# Patient Record
Sex: MALE | Employment: OTHER | URBAN - NONMETROPOLITAN AREA
[De-identification: names, ages, dates, MRNs, and addresses within clinical notes are randomized per-mention and may not be internally consistent; named-entity substitution may affect disease eponyms.]

---

## 2018-02-05 ENCOUNTER — HOSPITAL ENCOUNTER (OUTPATIENT)
Dept: PREADMISSION TESTING | Age: 73
Discharge: HOME | End: 2018-02-05
Payer: MEDICARE

## 2018-08-13 ENCOUNTER — HOSPITAL ENCOUNTER (OUTPATIENT)
Dept: GENERAL RADIOLOGY | Age: 73
Discharge: HOME | End: 2018-08-13
Payer: MEDICARE

## 2018-08-13 ENCOUNTER — HOSPITAL ENCOUNTER (OUTPATIENT)
Dept: PREADMISSION TESTING | Age: 73
Discharge: HOME | End: 2018-08-17
Payer: MEDICARE

## 2018-08-13 VITALS
RESPIRATION RATE: 18 BRPM | HEART RATE: 74 BPM | HEIGHT: 72 IN | TEMPERATURE: 95.8 F | BODY MASS INDEX: 29.26 KG/M2 | WEIGHT: 216.05 LBS | OXYGEN SATURATION: 98 %

## 2018-08-13 DIAGNOSIS — Z01.811 PRE-OP CHEST EXAM: ICD-10-CM

## 2018-08-13 DIAGNOSIS — I10 HYPERTENSION, UNSPECIFIED TYPE: ICD-10-CM

## 2018-08-13 LAB
ALBUMIN SERPL-MCNC: 4.2 G/DL (ref 3.5–5.1)
ANION GAP SERPL CALCULATED.3IONS-SCNC: 13 MEQ/L (ref 8–16)
BASOPHILS # BLD: 0.9 %
BASOPHILS ABSOLUTE: 0 THOU/MM3 (ref 0–0.1)
BUN BLDV-MCNC: 21 MG/DL (ref 7–22)
CALCIUM SERPL-MCNC: 10 MG/DL (ref 8.5–10.5)
CHLORIDE BLD-SCNC: 98 MEQ/L (ref 98–111)
CO2: 27 MEQ/L (ref 23–33)
CREAT SERPL-MCNC: 1.5 MG/DL (ref 0.4–1.2)
EKG ATRIAL RATE: 73 BPM
EKG P AXIS: 40 DEGREES
EKG P-R INTERVAL: 180 MS
EKG Q-T INTERVAL: 382 MS
EKG QRS DURATION: 100 MS
EKG QTC CALCULATION (BAZETT): 420 MS
EKG R AXIS: -49 DEGREES
EKG T AXIS: 72 DEGREES
EKG VENTRICULAR RATE: 73 BPM
EOSINOPHIL # BLD: 5 %
EOSINOPHILS ABSOLUTE: 0.3 THOU/MM3 (ref 0–0.4)
ERYTHROCYTE [DISTWIDTH] IN BLOOD BY AUTOMATED COUNT: 14.2 % (ref 11.5–14.5)
ERYTHROCYTE [DISTWIDTH] IN BLOOD BY AUTOMATED COUNT: 45.5 FL (ref 35–45)
GFR SERPL CREATININE-BSD FRML MDRD: 46 ML/MIN/1.73M2
GLUCOSE BLD-MCNC: 94 MG/DL (ref 70–108)
HCT VFR BLD CALC: 46.7 % (ref 42–52)
HEMOGLOBIN: 15.9 GM/DL (ref 14–18)
IMMATURE GRANS (ABS): 0.03 THOU/MM3 (ref 0–0.07)
IMMATURE GRANULOCYTES: 0.6 %
LYMPHOCYTES # BLD: 22.8 %
LYMPHOCYTES ABSOLUTE: 1.2 THOU/MM3 (ref 1–4.8)
MCH RBC QN AUTO: 30.3 PG (ref 26–33)
MCHC RBC AUTO-ENTMCNC: 34 GM/DL (ref 32.2–35.5)
MCV RBC AUTO: 89 FL (ref 80–94)
MONOCYTES # BLD: 10.7 %
MONOCYTES ABSOLUTE: 0.6 THOU/MM3 (ref 0.4–1.3)
MRSA NASAL SCREEN RT-PCR: NEGATIVE
NUCLEATED RED BLOOD CELLS: 0 /100 WBC
PLATELET # BLD: 396 THOU/MM3 (ref 130–400)
PMV BLD AUTO: 8.8 FL (ref 9.4–12.4)
POTASSIUM SERPL-SCNC: 4.4 MEQ/L (ref 3.5–5.2)
PREALBUMIN: 33 MG/DL (ref 20–40)
RBC # BLD: 5.25 MILL/MM3 (ref 4.7–6.1)
SEG NEUTROPHILS: 60 %
SEGMENTED NEUTROPHILS ABSOLUTE COUNT: 3.2 THOU/MM3 (ref 1.8–7.7)
SODIUM BLD-SCNC: 138 MEQ/L (ref 135–145)
STAPH AUREUS SCREEN RT-PCR: NEGATIVE
WBC # BLD: 5.4 THOU/MM3 (ref 4.8–10.8)

## 2018-08-13 PROCEDURE — 85025 COMPLETE CBC W/AUTO DIFF WBC: CPT

## 2018-08-13 PROCEDURE — 36415 COLL VENOUS BLD VENIPUNCTURE: CPT

## 2018-08-13 PROCEDURE — 87641 MR-STAPH DNA AMP PROBE: CPT

## 2018-08-13 PROCEDURE — 93010 ELECTROCARDIOGRAM REPORT: CPT | Performed by: NUCLEAR MEDICINE

## 2018-08-13 PROCEDURE — 93005 ELECTROCARDIOGRAM TRACING: CPT | Performed by: ORTHOPAEDIC SURGERY

## 2018-08-13 PROCEDURE — 71046 X-RAY EXAM CHEST 2 VIEWS: CPT

## 2018-08-13 PROCEDURE — 84134 ASSAY OF PREALBUMIN: CPT

## 2018-08-13 PROCEDURE — 80048 BASIC METABOLIC PNL TOTAL CA: CPT

## 2018-08-13 PROCEDURE — 87081 CULTURE SCREEN ONLY: CPT

## 2018-08-13 PROCEDURE — 87640 STAPH A DNA AMP PROBE: CPT

## 2018-08-13 PROCEDURE — 82040 ASSAY OF SERUM ALBUMIN: CPT

## 2018-08-13 RX ORDER — COVID-19 ANTIGEN TEST
KIT MISCELLANEOUS PRN
Status: ON HOLD | COMMUNITY
End: 2018-08-30 | Stop reason: HOSPADM

## 2018-08-13 RX ORDER — OLMESARTAN MEDOXOMIL AND HYDROCHLOROTHIAZIDE 40/25 40; 25 MG/1; MG/1
0.5 TABLET ORAL DAILY
COMMUNITY

## 2018-08-13 RX ORDER — METOPROLOL SUCCINATE 25 MG/1
12.5 TABLET, EXTENDED RELEASE ORAL DAILY
COMMUNITY

## 2018-08-13 ASSESSMENT — PAIN SCALES - GENERAL: PAINLEVEL_OUTOF10: 3

## 2018-08-13 ASSESSMENT — PAIN DESCRIPTION - ONSET: ONSET: AWAKENED FROM SLEEP

## 2018-08-13 ASSESSMENT — PAIN DESCRIPTION - LOCATION: LOCATION: LEG;BACK

## 2018-08-13 ASSESSMENT — PAIN DESCRIPTION - FREQUENCY: FREQUENCY: CONTINUOUS

## 2018-08-13 ASSESSMENT — PAIN DESCRIPTION - DESCRIPTORS: DESCRIPTORS: ACHING;NAGGING

## 2018-08-13 ASSESSMENT — PAIN DESCRIPTION - PROGRESSION: CLINICAL_PROGRESSION: GRADUALLY WORSENING

## 2018-08-13 ASSESSMENT — PAIN DESCRIPTION - ORIENTATION: ORIENTATION: RIGHT;LEFT

## 2018-08-13 ASSESSMENT — PAIN DESCRIPTION - PAIN TYPE: TYPE: CHRONIC PAIN

## 2018-08-13 NOTE — PROGRESS NOTES
In preparation for their surgical procedure above patient was screened for Obstructive Sleep Apnea (SARA) using the STOP-Bang Questionnaire by the Pre-Admission Testing department. This is a pre-surgical screening tool for patient safety and serves as a recommendation, this WILL NOT cause cancellation of surgery. STOP-Bang Questionnaire  * Do you currently see a pulmonologist?  No     If yes STOP, do not complete. P    1. Do you snore loudly (able to be heard in the next room)? Yes    2. Do you often feel tired or sleepy during the daytime? No       3. Has anyone ever told you that you stop breathing during your sleep? No    4. Do you have or are you being treated for high blood pressure? Yes      5. BMI more than 35? BMI (Calculated): 29.6        No    6. Age over 48 years? 74 y.o. Yes    7. Neck Circumference greater than 17 inches for male or 16 inches for female? Measured   18        (visits only)            Yes    8. Gender Male? Yes      TOTAL SCORE: 5    SARA - Low Risk : Yes to 0 - 2 questions  SARA - Intermediate Risk : Yes to 3 - 4 questions  SARA - High Risk : Yes to 5 - 8 questions    Adapted from:   STOP Questionnaire: A Tool to Screen Patients for Obstructive Sleep Apnea   DANIELITO Clinton.P.C., Mag Stewart, M.B.B.S., Trinh Ken M.D., Shawanda Dalton. Oliver Oglesby, Ph.D., Ramy Sanabria M.B.B.S., Ck Davidson M.Sc., Shukri Hillman M.D., Chaz Torres. DANIELITO Dacosta.P.C[facility]    Anesthesiology 2008; 095:801-82 Copyright 2008, the [address] of Anesthesiologists, [facility].   ----------------------------------------------------------------------------------------------------------------
Preliminary Discharge Planning Questionnaire  Date of Surgery darius   Surgeon 8/27/18      · Having the proper help and care after surgery is very important to your recovery. Who will be able to help you at home when you are discharged from the hospital? ([facility] - 24/7 for a minimum of 2 weeks)    spouse    Name(s) debaroh    · How many steps to enter your home? 2    · Bathroom on first floor? Yes    · Bedroom on the first floor? Yes    · Do you have an elevated toilet seat to use at home? No    · Do you have a walker to use at home? · Total Joints - with out wheels N/A   · Spine - with wheels  No     Have you been doing home exercises? no    *You will go home with some outpatient physical therapy, where do you prefer to go?  [facility]    *If needed, what home health agency would you like to use?  unsure    *Cardiac Rehab plans na
wear gloves and gowns when taking care of you. We do this (along with good hand hygiene) to make sure we do not spread MRSA to any another patients in our care. SURGERY PREPARATION CHECKLIST     NAME: ________________________________________     DATE OF SURGERY: ____________________________    Enter Dates, Check (?) circles to indicate task is completed. Date MUPIROCIN NASAL OINTMENT BODY CLEANSING     DAY 1 ______________________   Morning    Evening          Day 2 ______________________   Morning     Evening        Day 3 ______________________   Morning  Evening        Day 4 ______________________   Morning    Evening        Day 5 ______________________   Morning  Evening        Day 6 ______________________  (Day of Surgery)               PLEASE COMPLETE and BRING THIS CHECKLIST WITH YOU TO [address] to give to your nurse on the day of surgery. You will be notified if you need to use Mupirocin Nasal Ointment.    Lumbar Spine Surgery Pre-op Instructions   Nothing to eat or drink after midnight   Bring your medications in the original bottles    Bring photo ID and any health insurance cards   Wear comfortable clean loose fitting clothing   Do not wear any jewelry    Bring your walker   Bring your back brace if you were given one   Bring your CPAP machine if you have one   Wear clean clothes to bed and place fresh clean sheets and pillowcases on your bed the night before surgery   Spinal surgery video viewed   Routine preop instructions given for pain, hand hygiene, fall prevention, infection prevention, anesthesia, cough and deep breath, and progressive diet and ambulation   JULITO [id number]   Bathing:  o __x__Shower 2 days before, day before and morning of surgery using the StartClean® kit provided (follow the instructions provided with the kit), OR   o ____Cleanse your surgical site, 2 days before, day before and morning of surgery using the Marco 2% CHG cloths® provided (follow the instructions

## 2018-08-15 LAB — MRSA SCREEN: NORMAL

## 2018-08-20 NOTE — PROGRESS NOTES
LEFT MSG FOR TANIA, WITH  THE [facility] OF ABNORMAL EKG. CARDIAC CLEARANCE NEEDED?

## 2018-08-27 ENCOUNTER — APPOINTMENT (OUTPATIENT)
Dept: GENERAL RADIOLOGY | Age: 73
DRG: 460 | End: 2018-08-27
Attending: ORTHOPAEDIC SURGERY
Payer: MEDICARE

## 2018-08-27 ENCOUNTER — ANESTHESIA EVENT (OUTPATIENT)
Dept: OPERATING ROOM | Age: 73
DRG: 460 | End: 2018-08-27
Payer: MEDICARE

## 2018-08-27 ENCOUNTER — HOSPITAL ENCOUNTER (INPATIENT)
Age: 73
LOS: 4 days | Discharge: HOME | DRG: 460 | End: 2018-08-31
Attending: ORTHOPAEDIC SURGERY | Admitting: ORTHOPAEDIC SURGERY
Payer: MEDICARE

## 2018-08-27 ENCOUNTER — ANESTHESIA (OUTPATIENT)
Dept: OPERATING ROOM | Age: 73
DRG: 460 | End: 2018-08-27
Payer: MEDICARE

## 2018-08-27 VITALS
DIASTOLIC BLOOD PRESSURE: 59 MMHG | SYSTOLIC BLOOD PRESSURE: 112 MMHG | TEMPERATURE: 98.2 F | RESPIRATION RATE: 14 BRPM | OXYGEN SATURATION: 99 %

## 2018-08-27 DIAGNOSIS — Z98.1 S/P LUMBAR SPINAL FUSION: Primary | ICD-10-CM

## 2018-08-27 DIAGNOSIS — M48.062 SPINAL STENOSIS OF LUMBAR REGION WITH NEUROGENIC CLAUDICATION: ICD-10-CM

## 2018-08-27 LAB
ABO: NORMAL
ANTIBODY SCREEN: NORMAL
RH FACTOR: NORMAL

## 2018-08-27 PROCEDURE — 6360000002 HC RX W HCPCS: Performed by: ORTHOPAEDIC SURGERY

## 2018-08-27 PROCEDURE — 3600000015 HC SURGERY LEVEL 5 ADDTL 15MIN: Performed by: ORTHOPAEDIC SURGERY

## 2018-08-27 PROCEDURE — 0SG00AJ FUSION OF LUMBAR VERTEBRAL JOINT WITH INTERBODY FUSION DEVICE, POSTERIOR APPROACH, ANTERIOR COLUMN, OPEN APPROACH: ICD-10-PCS | Performed by: ORTHOPAEDIC SURGERY

## 2018-08-27 PROCEDURE — P9045 ALBUMIN (HUMAN), 5%, 250 ML: HCPCS

## 2018-08-27 PROCEDURE — 6370000000 HC RX 637 (ALT 250 FOR IP)

## 2018-08-27 PROCEDURE — 6360000002 HC RX W HCPCS: Performed by: PHYSICIAN ASSISTANT

## 2018-08-27 PROCEDURE — 72020 X-RAY EXAM OF SPINE 1 VIEW: CPT

## 2018-08-27 PROCEDURE — 95940 IONM IN OPERATNG ROOM 15 MIN: CPT | Performed by: ORTHOPAEDIC SURGERY

## 2018-08-27 PROCEDURE — 2580000003 HC RX 258: Performed by: ORTHOPAEDIC SURGERY

## 2018-08-27 PROCEDURE — 7100000000 HC PACU RECOVERY - FIRST 15 MIN: Performed by: ORTHOPAEDIC SURGERY

## 2018-08-27 PROCEDURE — 6370000000 HC RX 637 (ALT 250 FOR IP): Performed by: ORTHOPAEDIC SURGERY

## 2018-08-27 PROCEDURE — 86923 COMPATIBILITY TEST ELECTRIC: CPT

## 2018-08-27 PROCEDURE — 2580000003 HC RX 258: Performed by: ANESTHESIOLOGY

## 2018-08-27 PROCEDURE — 2720000010 HC SURG SUPPLY STERILE: Performed by: ORTHOPAEDIC SURGERY

## 2018-08-27 PROCEDURE — 72100 X-RAY EXAM L-S SPINE 2/3 VWS: CPT

## 2018-08-27 PROCEDURE — 6360000002 HC RX W HCPCS

## 2018-08-27 PROCEDURE — 86850 RBC ANTIBODY SCREEN: CPT

## 2018-08-27 PROCEDURE — 7100000001 HC PACU RECOVERY - ADDTL 15 MIN: Performed by: ORTHOPAEDIC SURGERY

## 2018-08-27 PROCEDURE — C1713 ANCHOR/SCREW BN/BN,TIS/BN: HCPCS | Performed by: ORTHOPAEDIC SURGERY

## 2018-08-27 PROCEDURE — 36415 COLL VENOUS BLD VENIPUNCTURE: CPT

## 2018-08-27 PROCEDURE — 3600000005 HC SURGERY LEVEL 5 BASE: Performed by: ORTHOPAEDIC SURGERY

## 2018-08-27 PROCEDURE — 3700000001 HC ADD 15 MINUTES (ANESTHESIA): Performed by: ORTHOPAEDIC SURGERY

## 2018-08-27 PROCEDURE — 2500000003 HC RX 250 WO HCPCS: Performed by: ANESTHESIOLOGY

## 2018-08-27 PROCEDURE — 1200000000 HC SEMI PRIVATE

## 2018-08-27 PROCEDURE — 86900 BLOOD TYPING SEROLOGIC ABO: CPT

## 2018-08-27 PROCEDURE — 2500000003 HC RX 250 WO HCPCS: Performed by: ORTHOPAEDIC SURGERY

## 2018-08-27 PROCEDURE — 2709999900 HC NON-CHARGEABLE SUPPLY: Performed by: ORTHOPAEDIC SURGERY

## 2018-08-27 PROCEDURE — 6360000002 HC RX W HCPCS: Performed by: ANESTHESIOLOGY

## 2018-08-27 PROCEDURE — 2580000003 HC RX 258: Performed by: PHYSICIAN ASSISTANT

## 2018-08-27 PROCEDURE — 86901 BLOOD TYPING SEROLOGIC RH(D): CPT

## 2018-08-27 PROCEDURE — L8699 PROSTHETIC IMPLANT NOS: HCPCS | Performed by: ORTHOPAEDIC SURGERY

## 2018-08-27 PROCEDURE — 3700000000 HC ANESTHESIA ATTENDED CARE: Performed by: ORTHOPAEDIC SURGERY

## 2018-08-27 DEVICE — CURVED ROD, 5.5 X 80
Type: IMPLANTABLE DEVICE | Site: SPINE LUMBAR | Status: FUNCTIONAL
Brand: CURVED ROD, 5.5 X 80

## 2018-08-27 DEVICE — CROSS CONNECTOR, MEDIUM-LARGE
Type: IMPLANTABLE DEVICE | Site: SPINE LUMBAR | Status: FUNCTIONAL
Brand: CROSS CONNECTOR, MEDIUM-LARGE

## 2018-08-27 DEVICE — IMPLANTABLE DEVICE: Type: IMPLANTABLE DEVICE | Site: SPINE LUMBAR | Status: FUNCTIONAL

## 2018-08-27 DEVICE — DBM T42200 2.5CMX10CM 2 EACH GRAFTON MAT
Type: IMPLANTABLE DEVICE | Site: SPINE LUMBAR | Status: FUNCTIONAL
Brand: GRAFTON®AND GRAFTON PLUS®DEMINERALIZED BONE MATRIX (DBM)

## 2018-08-27 DEVICE — IMPLANTABLE DEVICE
Type: IMPLANTABLE DEVICE | Site: SPINE LUMBAR | Status: FUNCTIONAL
Brand: SET SCREW

## 2018-08-27 DEVICE — POLYAXIAL SCREW, 6.5 X 50
Type: IMPLANTABLE DEVICE | Site: SPINE LUMBAR | Status: FUNCTIONAL
Brand: POLYAXIAL SCREW, 6.5 X 50

## 2018-08-27 RX ORDER — OLMESARTAN MEDOXOMIL AND HYDROCHLOROTHIAZIDE 40/25 40; 25 MG/1; MG/1
0.5 TABLET ORAL DAILY
Status: DISCONTINUED | OUTPATIENT
Start: 2018-08-27 | End: 2018-08-27 | Stop reason: CLARIF

## 2018-08-27 RX ORDER — ACETAMINOPHEN 325 MG/1
650 TABLET ORAL EVERY 4 HOURS PRN
Status: DISCONTINUED | OUTPATIENT
Start: 2018-08-27 | End: 2018-08-31 | Stop reason: HOSPADM

## 2018-08-27 RX ORDER — DEXAMETHASONE SODIUM PHOSPHATE 4 MG/ML
INJECTION, SOLUTION INTRA-ARTICULAR; INTRALESIONAL; INTRAMUSCULAR; INTRAVENOUS; SOFT TISSUE PRN
Status: DISCONTINUED | OUTPATIENT
Start: 2018-08-27 | End: 2018-08-27 | Stop reason: SDUPTHER

## 2018-08-27 RX ORDER — ROCURONIUM BROMIDE 10 MG/ML
INJECTION, SOLUTION INTRAVENOUS PRN
Status: DISCONTINUED | OUTPATIENT
Start: 2018-08-27 | End: 2018-08-27 | Stop reason: SDUPTHER

## 2018-08-27 RX ORDER — HYDROCHLOROTHIAZIDE 25 MG/1
12.5 TABLET ORAL DAILY
Status: DISCONTINUED | OUTPATIENT
Start: 2018-08-28 | End: 2018-08-28 | Stop reason: SDUPTHER

## 2018-08-27 RX ORDER — EPHEDRINE SULFATE 50 MG/ML
INJECTION INTRAVENOUS PRN
Status: DISCONTINUED | OUTPATIENT
Start: 2018-08-27 | End: 2018-08-27 | Stop reason: SDUPTHER

## 2018-08-27 RX ORDER — FENTANYL CITRATE 50 UG/ML
INJECTION, SOLUTION INTRAMUSCULAR; INTRAVENOUS PRN
Status: DISCONTINUED | OUTPATIENT
Start: 2018-08-27 | End: 2018-08-27 | Stop reason: SDUPTHER

## 2018-08-27 RX ORDER — PROPOFOL 10 MG/ML
INJECTION, EMULSION INTRAVENOUS PRN
Status: DISCONTINUED | OUTPATIENT
Start: 2018-08-27 | End: 2018-08-27 | Stop reason: SDUPTHER

## 2018-08-27 RX ORDER — LIDOCAINE HYDROCHLORIDE 20 MG/ML
INJECTION, SOLUTION INFILTRATION; PERINEURAL PRN
Status: DISCONTINUED | OUTPATIENT
Start: 2018-08-27 | End: 2018-08-27 | Stop reason: SDUPTHER

## 2018-08-27 RX ORDER — SODIUM CHLORIDE 9 MG/ML
INJECTION, SOLUTION INTRAVENOUS CONTINUOUS
Status: DISCONTINUED | OUTPATIENT
Start: 2018-08-27 | End: 2018-08-31 | Stop reason: HOSPADM

## 2018-08-27 RX ORDER — SODIUM CHLORIDE 0.9 % (FLUSH) 0.9 %
10 SYRINGE (ML) INJECTION PRN
Status: DISCONTINUED | OUTPATIENT
Start: 2018-08-27 | End: 2018-08-31 | Stop reason: HOSPADM

## 2018-08-27 RX ORDER — LIDOCAINE HYDROCHLORIDE AND EPINEPHRINE 10; 10 MG/ML; UG/ML
INJECTION, SOLUTION INFILTRATION; PERINEURAL PRN
Status: DISCONTINUED | OUTPATIENT
Start: 2018-08-27 | End: 2018-08-27 | Stop reason: HOSPADM

## 2018-08-27 RX ORDER — OXYCODONE HYDROCHLORIDE AND ACETAMINOPHEN 5; 325 MG/1; MG/1
1 TABLET ORAL EVERY 4 HOURS PRN
Status: DISCONTINUED | OUTPATIENT
Start: 2018-08-27 | End: 2018-08-31 | Stop reason: HOSPADM

## 2018-08-27 RX ORDER — ALBUMIN, HUMAN INJ 5% 5 %
12.5 SOLUTION INTRAVENOUS ONCE
Status: COMPLETED | OUTPATIENT
Start: 2018-08-27 | End: 2018-08-27

## 2018-08-27 RX ORDER — FENTANYL CITRATE 50 UG/ML
50 INJECTION, SOLUTION INTRAMUSCULAR; INTRAVENOUS EVERY 5 MIN PRN
Status: DISCONTINUED | OUTPATIENT
Start: 2018-08-27 | End: 2018-08-27 | Stop reason: HOSPADM

## 2018-08-27 RX ORDER — SODIUM CHLORIDE 9 MG/ML
INJECTION, SOLUTION INTRAVENOUS CONTINUOUS PRN
Status: DISCONTINUED | OUTPATIENT
Start: 2018-08-27 | End: 2018-08-27 | Stop reason: SDUPTHER

## 2018-08-27 RX ORDER — LOSARTAN POTASSIUM 50 MG/1
50 TABLET ORAL DAILY
Status: DISCONTINUED | OUTPATIENT
Start: 2018-08-28 | End: 2018-08-28 | Stop reason: SDUPTHER

## 2018-08-27 RX ORDER — HYDROMORPHONE HCL 110MG/55ML
PATIENT CONTROLLED ANALGESIA SYRINGE INTRAVENOUS PRN
Status: DISCONTINUED | OUTPATIENT
Start: 2018-08-27 | End: 2018-08-27 | Stop reason: SDUPTHER

## 2018-08-27 RX ORDER — SODIUM CHLORIDE 9 MG/ML
INJECTION, SOLUTION INTRAVENOUS CONTINUOUS
Status: DISCONTINUED | OUTPATIENT
Start: 2018-08-27 | End: 2018-08-27

## 2018-08-27 RX ORDER — DOCUSATE SODIUM 100 MG/1
100 CAPSULE, LIQUID FILLED ORAL 2 TIMES DAILY
Status: DISCONTINUED | OUTPATIENT
Start: 2018-08-27 | End: 2018-08-31 | Stop reason: HOSPADM

## 2018-08-27 RX ORDER — SODIUM CHLORIDE 0.9 % (FLUSH) 0.9 %
10 SYRINGE (ML) INJECTION EVERY 12 HOURS SCHEDULED
Status: DISCONTINUED | OUTPATIENT
Start: 2018-08-27 | End: 2018-08-31 | Stop reason: HOSPADM

## 2018-08-27 RX ORDER — ALBUMIN, HUMAN INJ 5% 5 %
SOLUTION INTRAVENOUS
Status: COMPLETED
Start: 2018-08-27 | End: 2018-08-27

## 2018-08-27 RX ORDER — MORPHINE SULFATE 2 MG/ML
2 INJECTION, SOLUTION INTRAMUSCULAR; INTRAVENOUS EVERY 5 MIN PRN
Status: DISCONTINUED | OUTPATIENT
Start: 2018-08-27 | End: 2018-08-27 | Stop reason: HOSPADM

## 2018-08-27 RX ORDER — METOPROLOL SUCCINATE 25 MG/1
12.5 TABLET, EXTENDED RELEASE ORAL DAILY
Status: DISCONTINUED | OUTPATIENT
Start: 2018-08-28 | End: 2018-08-31 | Stop reason: HOSPADM

## 2018-08-27 RX ORDER — OXYCODONE HYDROCHLORIDE AND ACETAMINOPHEN 5; 325 MG/1; MG/1
2 TABLET ORAL EVERY 4 HOURS PRN
Status: DISCONTINUED | OUTPATIENT
Start: 2018-08-27 | End: 2018-08-31 | Stop reason: HOSPADM

## 2018-08-27 RX ORDER — 0.9 % SODIUM CHLORIDE 0.9 %
250 INTRAVENOUS SOLUTION INTRAVENOUS ONCE
Status: DISCONTINUED | OUTPATIENT
Start: 2018-08-27 | End: 2018-08-27

## 2018-08-27 RX ORDER — LABETALOL HYDROCHLORIDE 5 MG/ML
10 INJECTION, SOLUTION INTRAVENOUS EVERY 10 MIN PRN
Status: DISCONTINUED | OUTPATIENT
Start: 2018-08-27 | End: 2018-08-27 | Stop reason: HOSPADM

## 2018-08-27 RX ORDER — OXYCODONE HCL 10 MG/1
10 TABLET, FILM COATED, EXTENDED RELEASE ORAL EVERY 12 HOURS SCHEDULED
Status: DISCONTINUED | OUTPATIENT
Start: 2018-08-27 | End: 2018-08-31 | Stop reason: HOSPADM

## 2018-08-27 RX ORDER — OXYCODONE HYDROCHLORIDE AND ACETAMINOPHEN 5; 325 MG/1; MG/1
TABLET ORAL
Status: COMPLETED
Start: 2018-08-27 | End: 2018-08-27

## 2018-08-27 RX ORDER — ACETAMINOPHEN 650 MG/1
650 SUPPOSITORY RECTAL EVERY 4 HOURS PRN
Status: DISCONTINUED | OUTPATIENT
Start: 2018-08-27 | End: 2018-08-31 | Stop reason: HOSPADM

## 2018-08-27 RX ORDER — ONDANSETRON 2 MG/ML
4 INJECTION INTRAMUSCULAR; INTRAVENOUS EVERY 6 HOURS PRN
Status: DISCONTINUED | OUTPATIENT
Start: 2018-08-27 | End: 2018-08-31 | Stop reason: HOSPADM

## 2018-08-27 RX ORDER — CYCLOBENZAPRINE HCL 10 MG
10 TABLET ORAL 3 TIMES DAILY PRN
Status: DISCONTINUED | OUTPATIENT
Start: 2018-08-27 | End: 2018-08-31 | Stop reason: HOSPADM

## 2018-08-27 RX ADMIN — PHENYLEPHRINE HYDROCHLORIDE 100 MCG: 10 INJECTION INTRAVENOUS at 12:40

## 2018-08-27 RX ADMIN — LIDOCAINE HYDROCHLORIDE 60 MG: 20 INJECTION, SOLUTION INFILTRATION; PERINEURAL at 11:41

## 2018-08-27 RX ADMIN — PHENYLEPHRINE HYDROCHLORIDE 100 MCG: 10 INJECTION INTRAVENOUS at 12:52

## 2018-08-27 RX ADMIN — OXYCODONE HYDROCHLORIDE AND ACETAMINOPHEN 2 TABLET: 5; 325 TABLET ORAL at 23:52

## 2018-08-27 RX ADMIN — HYDROMORPHONE HYDROCHLORIDE 0.5 MG: 2 INJECTION INTRAMUSCULAR; INTRAVENOUS; SUBCUTANEOUS at 14:08

## 2018-08-27 RX ADMIN — HYDROMORPHONE HYDROCHLORIDE 0.5 MG: 2 INJECTION INTRAMUSCULAR; INTRAVENOUS; SUBCUTANEOUS at 14:40

## 2018-08-27 RX ADMIN — PHENYLEPHRINE HYDROCHLORIDE 100 MCG: 10 INJECTION INTRAVENOUS at 12:54

## 2018-08-27 RX ADMIN — EPHEDRINE SULFATE 5 MG: 50 INJECTION, SOLUTION INTRAVENOUS at 13:24

## 2018-08-27 RX ADMIN — OXYCODONE HYDROCHLORIDE AND ACETAMINOPHEN 2 TABLET: 5; 325 TABLET ORAL at 15:29

## 2018-08-27 RX ADMIN — EPHEDRINE SULFATE 10 MG: 50 INJECTION, SOLUTION INTRAVENOUS at 13:06

## 2018-08-27 RX ADMIN — ALBUMIN (HUMAN) 12.5 G: 12.5 INJECTION, SOLUTION INTRAVENOUS at 15:20

## 2018-08-27 RX ADMIN — Medication 2 G: at 11:51

## 2018-08-27 RX ADMIN — EPHEDRINE SULFATE 15 MG: 50 INJECTION, SOLUTION INTRAVENOUS at 13:28

## 2018-08-27 RX ADMIN — SODIUM CHLORIDE: 9 INJECTION, SOLUTION INTRAVENOUS at 11:35

## 2018-08-27 RX ADMIN — PHENYLEPHRINE HYDROCHLORIDE 200 MCG: 10 INJECTION INTRAVENOUS at 13:04

## 2018-08-27 RX ADMIN — ALBUMIN, HUMAN INJ 5% 12.5 G: 5 SOLUTION at 15:20

## 2018-08-27 RX ADMIN — PHENYLEPHRINE HYDROCHLORIDE 200 MCG: 10 INJECTION INTRAVENOUS at 12:17

## 2018-08-27 RX ADMIN — OXYCODONE HYDROCHLORIDE 10 MG: 10 TABLET, FILM COATED, EXTENDED RELEASE ORAL at 21:35

## 2018-08-27 RX ADMIN — PROPOFOL 180 MG: 10 INJECTION, EMULSION INTRAVENOUS at 11:41

## 2018-08-27 RX ADMIN — DEXAMETHASONE SODIUM PHOSPHATE 10 MG: 4 INJECTION, SOLUTION INTRAMUSCULAR; INTRAVENOUS at 11:58

## 2018-08-27 RX ADMIN — PHENYLEPHRINE HYDROCHLORIDE 100 MCG: 10 INJECTION INTRAVENOUS at 12:27

## 2018-08-27 RX ADMIN — PHENYLEPHRINE HYDROCHLORIDE 100 MCG: 10 INJECTION INTRAVENOUS at 12:31

## 2018-08-27 RX ADMIN — PHENYLEPHRINE HYDROCHLORIDE 100 MCG: 10 INJECTION INTRAVENOUS at 12:13

## 2018-08-27 RX ADMIN — ROCURONIUM BROMIDE 50 MG: 10 INJECTION INTRAVENOUS at 11:41

## 2018-08-27 RX ADMIN — DOCUSATE SODIUM 100 MG: 100 CAPSULE, LIQUID FILLED ORAL at 21:35

## 2018-08-27 RX ADMIN — EPHEDRINE SULFATE 10 MG: 50 INJECTION, SOLUTION INTRAVENOUS at 13:09

## 2018-08-27 RX ADMIN — ROCURONIUM BROMIDE 10 MG: 10 INJECTION INTRAVENOUS at 12:06

## 2018-08-27 RX ADMIN — SODIUM CHLORIDE: 9 INJECTION, SOLUTION INTRAVENOUS at 21:10

## 2018-08-27 RX ADMIN — PHENYLEPHRINE HYDROCHLORIDE 100 MCG: 10 INJECTION INTRAVENOUS at 12:15

## 2018-08-27 RX ADMIN — FENTANYL CITRATE 100 MCG: 50 INJECTION INTRAMUSCULAR; INTRAVENOUS at 11:41

## 2018-08-27 RX ADMIN — SODIUM CHLORIDE: 9 INJECTION, SOLUTION INTRAVENOUS at 09:28

## 2018-08-27 RX ADMIN — HYDROMORPHONE HYDROCHLORIDE 1 MG: 2 INJECTION INTRAMUSCULAR; INTRAVENOUS; SUBCUTANEOUS at 12:08

## 2018-08-27 RX ADMIN — Medication 2 G: at 21:29

## 2018-08-27 ASSESSMENT — PULMONARY FUNCTION TESTS
PIF_VALUE: 16
PIF_VALUE: 19
PIF_VALUE: 19
PIF_VALUE: 20
PIF_VALUE: 11
PIF_VALUE: 19
PIF_VALUE: 19
PIF_VALUE: 18
PIF_VALUE: 12
PIF_VALUE: 19
PIF_VALUE: 12
PIF_VALUE: 18
PIF_VALUE: 18
PIF_VALUE: 1
PIF_VALUE: 19
PIF_VALUE: 11
PIF_VALUE: 17
PIF_VALUE: 3
PIF_VALUE: 17
PIF_VALUE: 19
PIF_VALUE: 19
PIF_VALUE: 17
PIF_VALUE: 19
PIF_VALUE: 18
PIF_VALUE: 19
PIF_VALUE: 11
PIF_VALUE: 18
PIF_VALUE: 2
PIF_VALUE: 11
PIF_VALUE: 19
PIF_VALUE: 16
PIF_VALUE: 16
PIF_VALUE: 19
PIF_VALUE: 19
PIF_VALUE: 16
PIF_VALUE: 19
PIF_VALUE: 19
PIF_VALUE: 18
PIF_VALUE: 11
PIF_VALUE: 1
PIF_VALUE: 0
PIF_VALUE: 19
PIF_VALUE: 16
PIF_VALUE: 19
PIF_VALUE: 16
PIF_VALUE: 19
PIF_VALUE: 19
PIF_VALUE: 21
PIF_VALUE: 22
PIF_VALUE: 19
PIF_VALUE: 14
PIF_VALUE: 19
PIF_VALUE: 18
PIF_VALUE: 14
PIF_VALUE: 11
PIF_VALUE: 19
PIF_VALUE: 15
PIF_VALUE: 15
PIF_VALUE: 19
PIF_VALUE: 17
PIF_VALUE: 18
PIF_VALUE: 19
PIF_VALUE: 11
PIF_VALUE: 19
PIF_VALUE: 18
PIF_VALUE: 17
PIF_VALUE: 19
PIF_VALUE: 17
PIF_VALUE: 2
PIF_VALUE: 20
PIF_VALUE: 11
PIF_VALUE: 16
PIF_VALUE: 18
PIF_VALUE: 12
PIF_VALUE: 16
PIF_VALUE: 19
PIF_VALUE: 18
PIF_VALUE: 19
PIF_VALUE: 19
PIF_VALUE: 17
PIF_VALUE: 19
PIF_VALUE: 11
PIF_VALUE: 19
PIF_VALUE: 11
PIF_VALUE: 17
PIF_VALUE: 19
PIF_VALUE: 1
PIF_VALUE: 15
PIF_VALUE: 19
PIF_VALUE: 15
PIF_VALUE: 21
PIF_VALUE: 18
PIF_VALUE: 20
PIF_VALUE: 11
PIF_VALUE: 16
PIF_VALUE: 16
PIF_VALUE: 19
PIF_VALUE: 18
PIF_VALUE: 17
PIF_VALUE: 16
PIF_VALUE: 11
PIF_VALUE: 11
PIF_VALUE: 17
PIF_VALUE: 19
PIF_VALUE: 16
PIF_VALUE: 19
PIF_VALUE: 19
PIF_VALUE: 20
PIF_VALUE: 16
PIF_VALUE: 3
PIF_VALUE: 19
PIF_VALUE: 20
PIF_VALUE: 18
PIF_VALUE: 17
PIF_VALUE: 12
PIF_VALUE: 19
PIF_VALUE: 19
PIF_VALUE: 22
PIF_VALUE: 19
PIF_VALUE: 19
PIF_VALUE: 11
PIF_VALUE: 17
PIF_VALUE: 19
PIF_VALUE: 19
PIF_VALUE: 20
PIF_VALUE: 19
PIF_VALUE: 3
PIF_VALUE: 17
PIF_VALUE: 19
PIF_VALUE: 19
PIF_VALUE: 10
PIF_VALUE: 16
PIF_VALUE: 18
PIF_VALUE: 17
PIF_VALUE: 15
PIF_VALUE: 11
PIF_VALUE: 11
PIF_VALUE: 16
PIF_VALUE: 19
PIF_VALUE: 19
PIF_VALUE: 18
PIF_VALUE: 17
PIF_VALUE: 20
PIF_VALUE: 16
PIF_VALUE: 19
PIF_VALUE: 17
PIF_VALUE: 16
PIF_VALUE: 19
PIF_VALUE: 6
PIF_VALUE: 19
PIF_VALUE: 13
PIF_VALUE: 19
PIF_VALUE: 1
PIF_VALUE: 19
PIF_VALUE: 16
PIF_VALUE: 19
PIF_VALUE: 17
PIF_VALUE: 19
PIF_VALUE: 15
PIF_VALUE: 19
PIF_VALUE: 11
PIF_VALUE: 16
PIF_VALUE: 19
PIF_VALUE: 17
PIF_VALUE: 18
PIF_VALUE: 19
PIF_VALUE: 20
PIF_VALUE: 17
PIF_VALUE: 21
PIF_VALUE: 19
PIF_VALUE: 19
PIF_VALUE: 18
PIF_VALUE: 19
PIF_VALUE: 19
PIF_VALUE: 20
PIF_VALUE: 19
PIF_VALUE: 19
PIF_VALUE: 11

## 2018-08-27 ASSESSMENT — PAIN SCALES - GENERAL
PAINLEVEL_OUTOF10: 3
PAINLEVEL_OUTOF10: 7
PAINLEVEL_OUTOF10: 3
PAINLEVEL_OUTOF10: 3
PAINLEVEL_OUTOF10: 5
PAINLEVEL_OUTOF10: 5

## 2018-08-27 ASSESSMENT — PAIN DESCRIPTION - DESCRIPTORS
DESCRIPTORS: DULL
DESCRIPTORS: ACHING
DESCRIPTORS: SHARP

## 2018-08-27 ASSESSMENT — PAIN DESCRIPTION - ONSET: ONSET: ON-GOING

## 2018-08-27 ASSESSMENT — PAIN DESCRIPTION - LOCATION
LOCATION: BACK
LOCATION: BACK

## 2018-08-27 ASSESSMENT — PAIN DESCRIPTION - ORIENTATION
ORIENTATION: MID
ORIENTATION: MID

## 2018-08-27 ASSESSMENT — PAIN DESCRIPTION - FREQUENCY: FREQUENCY: CONTINUOUS

## 2018-08-27 ASSESSMENT — PAIN DESCRIPTION - PAIN TYPE
TYPE: SURGICAL PAIN
TYPE: SURGICAL PAIN

## 2018-08-27 NOTE — PROGRESS NOTES
Admit to 7k1 from pacu. Alert and oriented x4. Lungs clear, diminished. Abdomen soft with hypoactive bs x4. Hand grap equal and strong. 200ml remaining in 1000ml bag of 0.9 infusing into rh without difficulty. Back drsg dry and intact with 2 hemovacs in place draining moderate to large amounts of bloody drainage. Ramos catheter in place draining clear yellow urine.   scd's in place bilaterally. Pedal pulses present bilaterally. Significant other at bedside.    Vs see shift assessment

## 2018-08-27 NOTE — PROGRESS NOTES
6052:  Patient admitted to [facility] room 10. Wife Laurier Boxer at bedside. Arm bands applied. Bilat. Socks, SCD's applied. Call light in reach.

## 2018-08-27 NOTE — ANESTHESIA PRE PROCEDURE
PARATHYROID GLAND SURGERY  2016    PT'S WIFE STATES HE HAS 1.5 OF HIS GLANDS LEFT; DONE AT [facility]    PROSTATECTOMY         Social History:    Social History   Substance Use Topics    Smoking status: Never Smoker    Smokeless tobacco: Never Used    Alcohol use No                                Counseling given: Not Answered      Vital Signs (Current):   Vitals:    08/27/18 0822 08/27/18 0859   BP: 134/77    Pulse: 71    Resp: 16    Temp: 97.7 °F (36.5 °C)    TempSrc: Temporal    SpO2: 94%    Weight: 216 lb 12.8 oz (98.3 kg)    Height:  5' 10.5\" (1.791 m)                                              BP Readings from Last 3 Encounters:   08/27/18 134/77       NPO Status: Time of last liquid consumption: 0645 (SIP WITH MEDS)                        Time of last solid consumption: 2000                        Date of last liquid consumption: 08/27/18                        Date of last solid food consumption: 08/26/18    BMI:   Wt Readings from Last 3 Encounters:   08/27/18 216 lb 12.8 oz (98.3 kg)   08/13/18 216 lb 0.8 oz (98 kg)     Body mass index is 30.67 kg/m². CBC:   Lab Results   Component Value Date    WBC 5.4 08/13/2018    RBC 5.25 08/13/2018    HGB 15.9 08/13/2018    HCT 46.7 08/13/2018    MCV 89.0 08/13/2018     08/13/2018       CMP:   Lab Results   Component Value Date     08/13/2018    K 4.4 08/13/2018    CL 98 08/13/2018    CO2 27 08/13/2018    BUN 21 08/13/2018    CREATININE 1.5 08/13/2018    LABGLOM 46 08/13/2018    GLUCOSE 94 08/13/2018    CALCIUM 10.0 08/13/2018       POC Tests: No results for input(s): POCGLU, POCNA, POCK, POCCL, POCBUN, POCHEMO, POCHCT in the last 72 hours.     Coags: No results found for: PROTIME, INR, APTT    HCG (If Applicable): No results found for: PREGTESTUR, PREGSERUM, HCG, HCGQUANT     ABGs: No results found for: PHART, PO2ART, OGG2RSY, OGI7WMX, BEART, E8GZPPNU     Type & Screen (If Applicable):  Lab Results   Component Value Date    LABRH POS 08/27/2018       Anesthesia Evaluation    Airway: Mallampati: II  TM distance: >3 FB   Neck ROM: full  Mouth opening: > = 3 FB Dental:          Pulmonary: breath sounds clear to auscultation                             Cardiovascular:    (+) hypertension:, CAD:,         Rhythm: regular                      Neuro/Psych:               GI/Hepatic/Renal:             Endo/Other:                     Abdominal:   (+) obese,         Vascular:                                        Anesthesia Plan      general     ASA 3       Induction: intravenous. MIPS: Postoperative opioids intended and Prophylactic antiemetics administered. Anesthetic plan and risks discussed with patient and spouse. Plan discussed with CRNA.                   Phebe Simmonds, MD   8/27/2018

## 2018-08-28 LAB
BASOPHILS # BLD: 0.2 %
BASOPHILS ABSOLUTE: 0 THOU/MM3 (ref 0–0.1)
EOSINOPHIL # BLD: 0 %
EOSINOPHILS ABSOLUTE: 0 THOU/MM3 (ref 0–0.4)
ERYTHROCYTE [DISTWIDTH] IN BLOOD BY AUTOMATED COUNT: 14.6 % (ref 11.5–14.5)
ERYTHROCYTE [DISTWIDTH] IN BLOOD BY AUTOMATED COUNT: 48.6 FL (ref 35–45)
HCT VFR BLD CALC: 34.4 % (ref 42–52)
HEMOGLOBIN: 11.4 GM/DL (ref 14–18)
IMMATURE GRANS (ABS): 0.07 THOU/MM3 (ref 0–0.07)
IMMATURE GRANULOCYTES: 0.6 %
LYMPHOCYTES # BLD: 5.7 %
LYMPHOCYTES ABSOLUTE: 0.6 THOU/MM3 (ref 1–4.8)
MCH RBC QN AUTO: 30 PG (ref 26–33)
MCHC RBC AUTO-ENTMCNC: 33.1 GM/DL (ref 32.2–35.5)
MCV RBC AUTO: 90.5 FL (ref 80–94)
MONOCYTES # BLD: 7.8 %
MONOCYTES ABSOLUTE: 0.9 THOU/MM3 (ref 0.4–1.3)
NUCLEATED RED BLOOD CELLS: 0 /100 WBC
PLATELET # BLD: 319 THOU/MM3 (ref 130–400)
PMV BLD AUTO: 8.9 FL (ref 9.4–12.4)
RBC # BLD: 3.8 MILL/MM3 (ref 4.7–6.1)
SEG NEUTROPHILS: 85.7 %
SEGMENTED NEUTROPHILS ABSOLUTE COUNT: 9.3 THOU/MM3 (ref 1.8–7.7)
WBC # BLD: 10.9 THOU/MM3 (ref 4.8–10.8)

## 2018-08-28 PROCEDURE — 1200000000 HC SEMI PRIVATE

## 2018-08-28 PROCEDURE — 6360000002 HC RX W HCPCS: Performed by: ORTHOPAEDIC SURGERY

## 2018-08-28 PROCEDURE — G8988 SELF CARE GOAL STATUS: HCPCS

## 2018-08-28 PROCEDURE — 6370000000 HC RX 637 (ALT 250 FOR IP): Performed by: ORTHOPAEDIC SURGERY

## 2018-08-28 PROCEDURE — 97530 THERAPEUTIC ACTIVITIES: CPT

## 2018-08-28 PROCEDURE — G8987 SELF CARE CURRENT STATUS: HCPCS

## 2018-08-28 PROCEDURE — G8979 MOBILITY GOAL STATUS: HCPCS

## 2018-08-28 PROCEDURE — 97166 OT EVAL MOD COMPLEX 45 MIN: CPT

## 2018-08-28 PROCEDURE — 85025 COMPLETE CBC W/AUTO DIFF WBC: CPT

## 2018-08-28 PROCEDURE — G8978 MOBILITY CURRENT STATUS: HCPCS

## 2018-08-28 PROCEDURE — 2580000003 HC RX 258: Performed by: ORTHOPAEDIC SURGERY

## 2018-08-28 PROCEDURE — 97161 PT EVAL LOW COMPLEX 20 MIN: CPT

## 2018-08-28 PROCEDURE — 97110 THERAPEUTIC EXERCISES: CPT

## 2018-08-28 PROCEDURE — 36415 COLL VENOUS BLD VENIPUNCTURE: CPT

## 2018-08-28 RX ORDER — OLMESARTAN MEDOXOMIL AND HYDROCHLOROTHIAZIDE 40/25 40; 25 MG/1; MG/1
0.5 TABLET ORAL DAILY
Status: DISCONTINUED | OUTPATIENT
Start: 2018-08-28 | End: 2018-08-31 | Stop reason: HOSPADM

## 2018-08-28 RX ADMIN — SODIUM CHLORIDE: 9 INJECTION, SOLUTION INTRAVENOUS at 04:45

## 2018-08-28 RX ADMIN — METOPROLOL SUCCINATE 12.5 MG: 25 TABLET, EXTENDED RELEASE ORAL at 08:55

## 2018-08-28 RX ADMIN — OXYCODONE HYDROCHLORIDE 10 MG: 10 TABLET, FILM COATED, EXTENDED RELEASE ORAL at 21:33

## 2018-08-28 RX ADMIN — DOCUSATE SODIUM 100 MG: 100 CAPSULE, LIQUID FILLED ORAL at 21:34

## 2018-08-28 RX ADMIN — DOCUSATE SODIUM 100 MG: 100 CAPSULE, LIQUID FILLED ORAL at 08:55

## 2018-08-28 RX ADMIN — SODIUM CHLORIDE: 9 INJECTION, SOLUTION INTRAVENOUS at 16:06

## 2018-08-28 RX ADMIN — Medication 2 G: at 03:55

## 2018-08-28 RX ADMIN — OLMESARTAN MEDOXOMIL AND HYDROCHLOROTHIAZIDE 40/25 0.5 TABLET: 40; 25 TABLET ORAL at 13:56

## 2018-08-28 RX ADMIN — OXYCODONE HYDROCHLORIDE AND ACETAMINOPHEN 1 TABLET: 5; 325 TABLET ORAL at 13:56

## 2018-08-28 ASSESSMENT — PAIN SCALES - GENERAL
PAINLEVEL_OUTOF10: 3
PAINLEVEL_OUTOF10: 4
PAINLEVEL_OUTOF10: 5
PAINLEVEL_OUTOF10: 0
PAINLEVEL_OUTOF10: 5
PAINLEVEL_OUTOF10: 0
PAINLEVEL_OUTOF10: 4
PAINLEVEL_OUTOF10: 2
PAINLEVEL_OUTOF10: 2

## 2018-08-28 ASSESSMENT — PAIN DESCRIPTION - PAIN TYPE: TYPE: SURGICAL PAIN

## 2018-08-28 ASSESSMENT — PAIN DESCRIPTION - ORIENTATION: ORIENTATION: MID

## 2018-08-28 ASSESSMENT — PAIN DESCRIPTION - LOCATION: LOCATION: BACK

## 2018-08-28 NOTE — OP NOTE
[address] [place] [address]                                 OPERATIVE REPORT    PATIENT NAME: Claudette Jenkins                        :        1945  MED REC NO:   [id number]                           ROOM:       0001  ACCOUNT NO:   [de-identified]                           ADMIT DATE: 2018  PROVIDER:     Emelina Wall M.D.    Do Plank OF PROCEDURE:  2018    PREOPERATIVE DIAGNOSES:  1. Lumbar spinal stenosis. 2.  Neurogenic claudication and leg radiculopathy. 3.  Degenerative disk disease. 4.  Retrolisthesis of the L4-L5 level. POSTOPERATIVE DIAGNOSES:  1. Lumbar spinal stenosis. 2.  Neurogenic claudication and leg radiculopathy. 3.  Degenerative disk disease. 4.  Retrolisthesis of the L4-L5 level. OPERATIONS PERFORMED:  1. Lumbar spine decompressive laminectomy, facetectomy, and lumbar      interbody fusion of L4-L5 as well as a posterolateral fusion of the L4-L5      level. 2.  Insertion of one Bison interbody cage of an 11 x 22 mm dimension      placed through a right hand side L4-L5 annulotomy. 3.  The use of one large kit of the Bria DBM 2 x 10 cm for fusion of      lumbar spine mixed with local bone grafting. 4.  L3 laminectomy. 5.  L3 bilateral posterolateral fusion of lumbar spine. 6.  Instrumentation of lumbar spine over levels of L3, L4, and L5 placed      bilateral segmental with use of the Bison pedicle screw and gumaro fixation      system. SURGEON:  Emelina Wall M.D.    [facility] Dark:  London Fuller PA-C. ANESTHESIA:  General.    BLOOD LOSS:  550 mL with return from Cell Saver 175 mL. DRAINS:  Hemovac x 2. COMPLICATIONS:  Zero. INDICATIONS:  The patient presents, 74years of age, having symptoms of  lumbar pain, leg radiculopathy, and symptoms that are bilateral giving him  difficulty standing, walking, and working.   His symptoms have failed to  improve and as a result of continued L5 bilateral.  We then backed out the  retractors. Prior to the graft insertion, irrigation was completed  including use of the Irrisept and with the graft in place, we would place  two drains, close in layers, and finish our closure and then take the  patient back to recovery post extubation having reviewed two of his x-rays  that showed well-placed pedicle screws and bridging rods over levels of L3,  L4, and L5. My first assistant was also Pat Crooks PA-C, as well. Pat Crooks, JAIME, assisted throughout the procedure with positioning,  draping, retraction, wound closure, dressing, and splint application.         Kendell Boxer, M.D.    D: 08/27/2018 14:49:26       T: 08/27/2018 18:11:47     [email or web address]  Job#: [id number]     Doc#: [id number]    CC:

## 2018-08-28 NOTE — CARE COORDINATION
8/28/18, 8:53 AM      [place]       Admitted from:Procedure 8/27/2018/ 0811 Hospital day: 1   Location: [facility]01/001-A Reason for admit: Spinal stenosis of lumbar region with neurogenic claudication [M48.062] Status: IP  Admit order signed?: yes  PMH:  has a past medical history of Cancer ([address] and Hypertension. Procedure:   8/28 LUMBAR LAMINECTOMY WITH PSF/PLIF L4-5, L3 sky/PSF WITH CAPTIVA, DBM and LOCAL BONE GRAFTING    Pertinent abnormal Imaging:none  Medications:  Scheduled Meds:   metoprolol succinate  12.5 mg Oral Daily    sodium chloride flush  10 mL Intravenous 2 times per day    docusate sodium  100 mg Oral BID    oxyCODONE  10 mg Oral 2 times per day    losartan  50 mg Oral Daily    hydrochlorothiazide  12.5 mg Oral Daily     Continuous Infusions:   sodium chloride 100 mL/hr at 08/28/18 0445      Pertinent Info/Orders/Treatment Plan:  Ortho following. PT/OT. Ramos with order to remove today. Drain and wound care. IVF. Pain control. C-collar. Diet: DIET GENERAL;   DVT Prophylaxis: SCD's ordered  Smoking status:  reports that he has never smoked. He has never used smokeless tobacco.   Influenza Vaccination Screening Completed: n/a  Pneumonia Vaccination Screening Completed: yes  PCP: Fabrizio Forrest MD  Readmission: no  Readmission Risk Score: 5%    Discharge Planning  Current Residence:     Living Arrangements:      Support Systems:     Current Services PTA:     Potential Assistance Needed:     Potential Assistance Purchasing Medications:     Does patient want to participate in local refill/ meds to beds program?     Type of Home Care Services:     Patient expects to be discharged to:     Expected Discharge date: Follow Up Appointment: Best Day/ Time:      Discharge Plan: Spoke with patient and wife, plans to return home at discharge. Declines HH, unless he would be discharged with drain. Has walker and cane if needed.       Evaluation: no

## 2018-08-28 NOTE — PROGRESS NOTES
Department of Orthopedic Surgery  Spine Service  Attending Progress Note        Subjective:  Feeling well with relief of Le PAIN    Vitals  VITALS:  /61   Pulse 80   Temp 97.5 °F (36.4 °C) (Oral)   Resp 16   Ht 5' 10.5\" (1.791 m)   Wt 216 lb 12.8 oz (98.3 kg)   SpO2 95%   BMI 30.67 kg/m²   24HR INTAKE/OUTPUT:    Intake/Output Summary (Last 24 hours) at 08/28/18 0710  Last data filed at 08/28/18 0357   Gross per 24 hour   Intake           6800.4 ml   Output             2930 ml   Net           3870.4 ml     URINARY CATHETER OUTPUT (Raoms):  Urethral Catheter-Output (mL): 800 mL  DRAIN/TUBE OUTPUT:  Closed/Suction Drain Posterior; Left Back Accordion-Output (ml): 90 ml  Closed/Suction Drain Right;Posterior Back Accordion-Output (ml): 120 ml       PHYSICAL EXAM:    Orientation:  alert and oriented to person, place and time    Incision:  dressing in place, clean, dry, intact       Lower Extremity Motor :  quadriceps, extensor hallucis longus, dorsiflexion, plantarflexion 5/5 bilaterally  Lower Extremity Sensory:  Intact L1-S1    Flatus:  negative    ABNORMAL EXAM FINDINGS:  none    LABS:    HgB:    Lab Results   Component Value Date    HGB 11.4 08/28/2018          ASSESSMENT AND PLAN:    Post operative day 1    1:  Monitor labs and drain output  2:  Activity Level:  PT  3:  Pain Control: PO meds  4:  Discharge Planning:  home

## 2018-08-28 NOTE — PLAN OF CARE
Care:  Goal: Daily care needs are met  Daily care needs are met   Outcome: Ongoing  Pt assisted with daily care needs. Pt able to use call light to request assistance. Will keep monitoring. Problem: Discharge Planning:  Goal: Patients continuum of care needs are met  Patients continuum of care needs are met   Outcome: Ongoing  Pt plans home with wife at discharge. Care manager and social working helping with discharge needs. Comments: Care plan reviewed with patient. Patient verbalizes understanding of the plan of care and contribute to goal setting.

## 2018-08-28 NOTE — PROGRESS NOTES
[facility] [address]  INPATIENT OCCUPATIONAL THERAPY  [facility] 7K  EVALUATION    Time:  Time In: 732  Time Out: 4398  Timed Code Treatment Minutes: 28 Minutes  Minutes: 42    Date: 2018  Patient Name: Ciara Forrest,   Gender: male      MRN: [id number]  : 1945  (74 y.o.)  Referring Practitioner: Kori Brumfield MD  Diagnosis: Spinal Stenosis  Additional Pertinent Hx: This 74year old male is s/p LUMBAR LAMINECTOMY WITH PSF/PLIF L4-5, L3 sky/PSF WITH CAPTIVA, DBM and LOCAL BONE GRAFTING  on 18 by Dr. Asha Barrientos    Restrictions/Precautions:  General Precautions, Fall Risk     Spinal Precautions: No Bending, No Lifting, No Twisting  Spinal: Lumbar Corset    Past Medical History:   Diagnosis Date    Cancer ([facility]     prostate    Hypertension      Past Surgical History:   Procedure Laterality Date    COLONOSCOPY      CORONARY ANGIOPLASTY WITH STENT PLACEMENT      JOINT REPLACEMENT Left     knee    PARATHYROID GLAND SURGERY      PT'S WIFE STATES HE HAS 1.5 OF HIS GLANDS LEFT; DONE AT [facility]    PROSTATECTOMY             Subjective  Chart Reviewed: Yes  Patient assessed for rehabilitation services?: Yes  Family / Caregiver Present: No    Subjective: RN okayed OT session. Upon arrival patient was sitting up in bed. Pt was agreeable to OT session.      General:  Overall Orientation Status: Within Functional Limits    Vision: Impaired  Vision Exceptions: Wears glasses at all times    Hearing: Exceptions to [facility] (Hearing aides are at home. )  Hearing Exceptions: Bilateral hearing aid, Hard of hearing/hearing concerns    Pain:  Pain Assessment  Patient Currently in Pain: Yes  Pain Assessment: 0-10  Pain Level: 2  Pain Type: Surgical pain  Pain Location: Back  Pain Orientation: Mid       Social/Functional History:  Lives With: Spouse  Type of Home: House  Home Layout: Two level, Performs ADL's on one level, Able to Live on Main level with bedroom/bathroom  Home Access: Stairs to enter without rails  Entrance Stairs - Number of Steps: 2 EYAL  Home Equipment: Rolling walker, Cane     Bathroom Shower/Tub: Tub/Shower unit  Bathroom Toilet: Standard  Bathroom Accessibility: Accessible  IADL Comments: Pt was indep with all yardwork and farmering tasks. ADL Assistance: Independent  Homemaking Assistance: Needs assistance     Ambulation Assistance: Independent  Transfer Assistance: Independent    Active : Yes  Mode of Transportation: Car  Occupation: Retired  Type of occupation: [employer]     Objective  Overall Cognitive Status: WFL    Sensation  Overall Sensation Status: WFL          LUE AROM (degrees)  LUE AROM : WFL     RUE AROM (degrees)  RUE AROM : WFL     LUE Strength  L Hand Grasp: 4+/5  L Hand Release: 4+/5  LUE Strength Comment: Not tested d/t spinal precaution. RUE Strength  R Hand Grasp: 4+/5  R Hand Release: 4+/5  RUE Strength Comment: Not tested d/t spinal precautions. ADL  UE Dressing: Minimal assistance (To don Lumbar corset. )  LE Dressing: Moderate assistance (To don B socks. )     Bed mobility  Supine to Sit: Minimal assistance (log roll technique. )  Scooting: Contact guard assistance (To scoot hips to EOB. )    Transfers  Sit to stand: Contact guard assistance (from EOB with min vc for hand placement. )  Stand to sit: Contact guard assistance (Onto recliner. )    Balance  Sitting Balance: Supervision  Standing Balance: Contact guard assistance     Time: 45 seconds  Activity: Prep to ambulate      Functional Mobility  Functional - Mobility Device: Rolling Walker  Activity: Other  Assist Level: Contact guard assistance  Functional Mobility Comments: pt ambulated 15 feet in room, slow pace, No LOB,      Activity Tolerance:  Activity Tolerance: Patient limited by fatigue, Patient limited by pain    Treatment Initiated:  OT Evaluation completed, see above for details. Assessment:  Assessment: this 74year old male is s/p spinal sx.  Pt requires skilled OT intervention to

## 2018-08-28 NOTE — PROGRESS NOTES
[address] [address]  INPATIENT PHYSICAL THERAPY  EVALUATION  [facility] 7K - 7K-01/001-A    Time In: 1030  Time Out: 1054  Timed Code Treatment Minutes: 8 Minutes  Minutes: 24          Date: 2018  Patient Name: Ciara Forrest,  Gender:  male        MRN: [id number]  : 1945  (74 y.o.)      Referring Practitioner: Cl Freedman MD  Diagnosis: Spinal stenosis of lumbar region with neurogenic claudication  Additional Pertinent Hx: This 74year old male is s/p LUMBAR LAMINECTOMY WITH PSF/PLIF L4-5, L3 sky/PSF WITH CAPTIVA, DBM and LOCAL BONE GRAFTING  on 18 by Dr. Asha Barrientos     Past Medical History:   Diagnosis Date    Cancer [facility])     prostate    Hypertension      Past Surgical History:   Procedure Laterality Date    COLONOSCOPY      CORONARY ANGIOPLASTY WITH STENT PLACEMENT      JOINT REPLACEMENT Left     knee    PARATHYROID GLAND SURGERY      PT'S WIFE STATES HE HAS 1.5 OF HIS GLANDS LEFT; DONE AT [facility]    [place] OFFICE/OUTPT VISIT,PROCEDURE ONLY N/A 2018    LUMBAR LAMINECTOMY WITH PSF/PLIF L4-5 WITH CAPTIVA, DBM LOCAL BONE GRAFTING and ICBG performed by Kori Brumfield MD at [address] [place] [address]         Restrictions/Precautions:  General Precautions, Fall Risk          Spinal Precautions: No Bending, No Lifting, No Twisting  Spinal: Lumbar Corset    Subjective:  Chart Reviewed: Yes  Patient assessed for rehabilitation services?: Yes  Family / Caregiver Present: Yes (wife)  Subjective: RN approved session, pt is up in chair, pleasant and motivated. General:  Overall Orientation Status: Within Functional Limits  Follows Commands: Within Functional Limits    Vision: Impaired  Vision Exceptions: Wears glasses at all times    Hearing: Exceptions to [facility] (Hearing aides are at home. )  Hearing Exceptions: Bilateral hearing aid, Hard of hearing/hearing concerns       Pain:   .      Pre Treatment Pain Screening  Pain at present: 3  Scale Used: Numeric Score  Intervention List: Patient able to continue with treatment    Social/Functional History:    Lives With: Spouse  Type of Home: House  Home Layout: Two level, Performs ADL's on one level, Able to Live on Main level with bedroom/bathroom  Home Access: Stairs to enter without rails  Entrance Stairs - Number of Steps: 2 EYAL  Home Equipment: Rolling walker, Cane     Bathroom Shower/Tub: Tub/Shower unit  Bathroom Toilet: Standard  Bathroom Accessibility: Accessible  IADL Comments: Pt was indep with all yardwork and farmering tasks. ADL Assistance: Independent  Homemaking Assistance: Needs assistance  Ambulation Assistance: Independent  Transfer Assistance: Independent    Active : Yes  Mode of Transportation: Car  Occupation: Retired  Type of occupation: [employer]        Objective:       RLE AROM: WFL       LLE AROM : WFL       B LE's functionally 4/5    Sensation  Overall Sensation Status: WFL    RLE Tone: Normotonic  LLE Tone: Normotonic           Transfers  Sit to Stand: Stand by assistance (From recliner)  Stand to sit: Stand by assistance       Ambulation 1  Surface: level tile  Device: Rolling Walker  Assistance: Stand by assistance  Quality of Gait: Pt amb with decreased speed and foreign, decreased step length, steady without LOB. Distance: 400 feet            Exercises:  Comments: Pt performs seated B LE AROM: ankle pumps, hip abd/add while reclined, seated marches and LAQ x 10 reps to increase strength for improved gait. Activity Tolerance:  Activity Tolerance: Patient Tolerated treatment well    Treatment Initiated: See above exercises. Assessment: Body structures, Functions, Activity limitations: Decreased functional mobility , Decreased strength  Assessment: Pt tolerates session well, amb in hallway with RW without difficulty. Pt education to amb several times/day with staff or family.   Pt demonstrates decreased strength, transfers and gait indicating the need for skilled PT

## 2018-08-29 LAB
BASOPHILS # BLD: 0.2 %
BASOPHILS ABSOLUTE: 0 THOU/MM3 (ref 0–0.1)
EOSINOPHIL # BLD: 1.3 %
EOSINOPHILS ABSOLUTE: 0.1 THOU/MM3 (ref 0–0.4)
ERYTHROCYTE [DISTWIDTH] IN BLOOD BY AUTOMATED COUNT: 14.6 % (ref 11.5–14.5)
ERYTHROCYTE [DISTWIDTH] IN BLOOD BY AUTOMATED COUNT: 50.1 FL (ref 35–45)
HCT VFR BLD CALC: 34.7 % (ref 42–52)
HEMOGLOBIN: 11.3 GM/DL (ref 14–18)
IMMATURE GRANS (ABS): 0.06 THOU/MM3 (ref 0–0.07)
IMMATURE GRANULOCYTES: 0.6 %
LYMPHOCYTES # BLD: 12.4 %
LYMPHOCYTES ABSOLUTE: 1.2 THOU/MM3 (ref 1–4.8)
MCH RBC QN AUTO: 30.2 PG (ref 26–33)
MCHC RBC AUTO-ENTMCNC: 32.6 GM/DL (ref 32.2–35.5)
MCV RBC AUTO: 92.8 FL (ref 80–94)
MONOCYTES # BLD: 10.3 %
MONOCYTES ABSOLUTE: 1 THOU/MM3 (ref 0.4–1.3)
NUCLEATED RED BLOOD CELLS: 0 /100 WBC
PLATELET # BLD: 306 THOU/MM3 (ref 130–400)
PMV BLD AUTO: 9.1 FL (ref 9.4–12.4)
RBC # BLD: 3.74 MILL/MM3 (ref 4.7–6.1)
SEG NEUTROPHILS: 75.2 %
SEGMENTED NEUTROPHILS ABSOLUTE COUNT: 7 THOU/MM3 (ref 1.8–7.7)
WBC # BLD: 9.3 THOU/MM3 (ref 4.8–10.8)

## 2018-08-29 PROCEDURE — 97530 THERAPEUTIC ACTIVITIES: CPT

## 2018-08-29 PROCEDURE — 6370000000 HC RX 637 (ALT 250 FOR IP): Performed by: ORTHOPAEDIC SURGERY

## 2018-08-29 PROCEDURE — 97535 SELF CARE MNGMENT TRAINING: CPT

## 2018-08-29 PROCEDURE — 2580000003 HC RX 258: Performed by: ORTHOPAEDIC SURGERY

## 2018-08-29 PROCEDURE — 97116 GAIT TRAINING THERAPY: CPT

## 2018-08-29 PROCEDURE — 36415 COLL VENOUS BLD VENIPUNCTURE: CPT

## 2018-08-29 PROCEDURE — 97110 THERAPEUTIC EXERCISES: CPT

## 2018-08-29 PROCEDURE — 6370000000 HC RX 637 (ALT 250 FOR IP): Performed by: PHYSICIAN ASSISTANT

## 2018-08-29 PROCEDURE — 85025 COMPLETE CBC W/AUTO DIFF WBC: CPT

## 2018-08-29 PROCEDURE — 1200000000 HC SEMI PRIVATE

## 2018-08-29 RX ORDER — BISACODYL 10 MG
10 SUPPOSITORY, RECTAL RECTAL ONCE
Status: COMPLETED | OUTPATIENT
Start: 2018-08-29 | End: 2018-08-29

## 2018-08-29 RX ORDER — SODIUM PHOSPHATE,MONO-DIBASIC 19G-7G/118
1 ENEMA (ML) RECTAL ONCE
Status: COMPLETED | OUTPATIENT
Start: 2018-08-29 | End: 2018-08-29

## 2018-08-29 RX ADMIN — BISACODYL 10 MG: 10 SUPPOSITORY RECTAL at 08:50

## 2018-08-29 RX ADMIN — DOCUSATE SODIUM 100 MG: 100 CAPSULE, LIQUID FILLED ORAL at 09:01

## 2018-08-29 RX ADMIN — OLMESARTAN MEDOXOMIL AND HYDROCHLOROTHIAZIDE 40/25 0.5 TABLET: 40; 25 TABLET ORAL at 09:02

## 2018-08-29 RX ADMIN — MAGNESIUM HYDROXIDE 30 ML: 400 SUSPENSION ORAL at 10:17

## 2018-08-29 RX ADMIN — SODIUM PHOSPHATE 1 ENEMA: 7; 19 ENEMA RECTAL at 10:19

## 2018-08-29 RX ADMIN — Medication 10 ML: at 21:38

## 2018-08-29 RX ADMIN — METOPROLOL SUCCINATE 12.5 MG: 25 TABLET, EXTENDED RELEASE ORAL at 09:02

## 2018-08-29 RX ADMIN — Medication 10 ML: at 09:01

## 2018-08-29 ASSESSMENT — PAIN SCALES - GENERAL
PAINLEVEL_OUTOF10: 4
PAINLEVEL_OUTOF10: 0
PAINLEVEL_OUTOF10: 4
PAINLEVEL_OUTOF10: 0
PAINLEVEL_OUTOF10: 0

## 2018-08-29 ASSESSMENT — PAIN DESCRIPTION - LOCATION
LOCATION: BACK
LOCATION: BACK

## 2018-08-29 ASSESSMENT — PAIN DESCRIPTION - PAIN TYPE
TYPE: SURGICAL PAIN
TYPE: SURGICAL PAIN

## 2018-08-29 ASSESSMENT — PAIN DESCRIPTION - ORIENTATION: ORIENTATION: MID

## 2018-08-29 NOTE — PLAN OF CARE
Problem: DISCHARGE BARRIERS  Goal: Patient's continuum of care needs are met  Outcome: Ongoing  Planning home with family, possible home health. Please see progress note 08/29/18.

## 2018-08-29 NOTE — PLAN OF CARE
Problem: Pain:  Goal: Pain level will decrease  Pain level will decrease   Outcome: Ongoing  Pt report pain at 4 on scale. Pt states oral Oxycodone medication helping to achieve pain goal of a 3 on scale. Problem: Neurological  Goal: Maximum potential motor/sensory/cognitive function  Outcome: Ongoing  Pt Alert and Oriented x 4. Denies numbness and tingling. Strong hand  and pedal push and pull bilaterally. Problem: Cardiovascular  Goal: No DVT, peripheral vascular complications  Outcome: Ongoing  Pt without s/s of DVT. Pt using SCD,S in place to help prevent development of DVT. Problem: Respiratory  Goal: O2 Sat > 90%  Outcome: Ongoing  Pt sats 93  % on on room air. No shortness of breath noted. Lungs clear and diminishes throughout, uses IS. Problem: GI  Goal: No bowel complications  Outcome: Ongoing  Pt with active bowel sounds, not passing flatus, and without nausea. Taking prescribed medications to assist with BM      Problem:   Goal: Adequate urinary output  Outcome: Ongoing  Pt voiding adequate amounts without difficulty. Problem: Nutrition  Goal: Optimal nutrition therapy  Outcome: Ongoing  Pt on General Diet. No nausea or vomiting noted this shift. Pt reports to have good appetite. Problem: Skin Integrity/Risk  Goal: No skin breakdown during hospitalization  Outcome: Ongoing   Dressing is dry and intact . No other skin impairments noted. Pt understands the importance of frequent repositioning in order to prevent any skin breakdown. Problem: Musculor/Skeletal Functional Status  Goal: Highest potential functional level  Outcome: Ongoing  Pt working with PT/OT. Pt ambulated with one assist ,gait belt on and two  wheel walker. Pt tolerated well. Problem: Safety:  Goal: Free from accidental physical injury  Free from accidental physical injury   Outcome: Ongoing  Pt using call light appropriately to call for assistance with ambulation to the bathroom and to chair.

## 2018-08-29 NOTE — PROGRESS NOTES
[facility]  INPATIENT PHYSICAL THERAPY  DAILYNOTE  [facility] ORTHOPEDICS 7K - 7K-01/001-A    Time In: 0805  Time Out: 3067  Timed Code Treatment Minutes: 24 Minutes  Minutes: 24          Date: 2018  Patient Name: Yaya Rivera,  Gender:  male        MRN: [id number]  : 1945  (74 y.o.)     Referring Practitioner: Mykel Hinojosa MD  Diagnosis: Spinal stenosis of lumbar region with neurogenic claudication  Additional Pertinent Hx: This 74year old male is s/p LUMBAR LAMINECTOMY WITH PSF/PLIF L4-5, L3 sky/PSF WITH CAPTIVA, DBM and LOCAL BONE GRAFTING  on 18 by Dr. Sanchez Wilson     Past Medical History:   Diagnosis Date    Cancer [facility])     prostate    Hypertension      Past Surgical History:   Procedure Laterality Date    COLONOSCOPY      CORONARY ANGIOPLASTY WITH STENT PLACEMENT      JOINT REPLACEMENT Left     knee    PARATHYROID GLAND SURGERY      PT'S WIFE STATES HE HAS 1.5 OF HIS GLANDS LEFT; DONE AT [facility]    [place] OFFICE/OUTPT VISIT,PROCEDURE ONLY N/A 2018    LUMBAR LAMINECTOMY WITH PSF/PLIF L4-5 WITH CAPTIVA, DBM LOCAL BONE GRAFTING and ICBG performed by Leon Hernandez MD at [address] [place] [address]         Restrictions/Precautions:  General Precautions, Fall Risk                    Spinal Precautions: No Bending, No Lifting, No Twisting  Spinal: Lumbar Corset    Prior Level of Function:  ADL Assistance: Independent  Homemaking Assistance: Needs assistance  Ambulation Assistance: Independent  Transfer Assistance: Independent       Subjective:     Subjective: RN approved therapy session. Pt. pleasant and agreeable to therapy session. Spouse present and supportive. Pt. reports pressure in abdomen during session. Pain:  Yes.   Pain Assessment  Pain Assessment: 0-10  Pain Level: 4  Pain Type: Surgical pain  Pain Location: Back       Social/Functional:  Lives With: Spouse  Type of Home: House  Home Layout: Two level, Performs ADL's on one level, Able to Live on Main level with bedroom/bathroom  Home Access: Stairs to enter without rails  Entrance Stairs - Number of Steps: 2 EYAL  Home Equipment: Rolling walker, Cane     Objective:       Transfers  Sit to Stand: Stand by assistance  Stand to sit: Stand by assistance       Ambulation 1  Surface: level tile  Device: Rolling Walker  Assistance: Stand by assistance  Quality of Gait: Decreased foreign and velocity. Decreased step length and heel strike, Slight forward flexed posture with downward gaze with cues to correct. Pt. slightly unsteady but with no LOB. Pt. with increased SOB following ambulation. Distance: 400 feet            Exercises:  Exercises  Comments: Pt performs seated B LE ther ex 10x each consisting of ankle pumps, hip abd/add, heel slides, marches and LAQ all to increase strength for improved gait. Activity Tolerance:  Activity Tolerance: Patient Tolerated treatment well;Patient limited by fatigue    Assessment: Body structures, Functions, Activity limitations: Decreased functional mobility , Decreased strength  Assessment: Pt. tolerated session well. Pt. fatigued at end of session and would benefit from continued skilled PT to increase endurance to enhance functional mobility. Prognosis: Excellent     REQUIRES PT FOLLOW UP: Yes  Discharge Recommendations: Home with assist PRN    Patient Education:  Patient Education: POC, ther ex, ambulation, transfers    Equipment Recommendations:  Equipment Needed: No    Safety:  Type of devices:  All fall risk precautions in place, Call light within reach, Chair alarm in place, Gait belt, Left in chair, Nurse notified  Restraints  Initially in place: No    Plan:  Times per week: 6 X O  Times per day: Daily  Current Treatment Recommendations: Strengthening, Functional Mobility Training, Transfer Training, Stair training, Gait Training, Home Exercise Program, Patient/Caregiver Education & Training, Safety Education & Training, Equipment Evaluation, Education, & procurement    Goals:  Patient goals : To go home. Short term goals  Time Frame for Short term goals: 1 week  Short term goal 1: Pt to transfer supine <--> sit S to enable pt to get in/out of bed. Short term goal 2: Pt to transfer sit <--> stand mod I for increased functional mobility. Short term goal 3: Pt to ambulate >500 feet with RW mod I for household ambulation. Short term goal 4: Pt to ascend/descend 2 steps without HR SBA for home entry. Short term goal 5: Pt to perform car transfer SBA to enable pt to get in/out of the car. Long term goals  Time Frame for Long term goals : NA due to short length of stay.             AM-PAC Inpatient Mobility without Stair Climbing Raw Score : 16  AM-PAC Inpatient without Stair Climbing T-Scale Score : 45.54  Mobility Inpatient CMS 0-100% Score: 40.64  Mobility Inpatient without Stair CMS G-Code Modifier : CK

## 2018-08-29 NOTE — PROGRESS NOTES
[facility]  INPATIENT OCCUPATIONAL THERAPY  [facility] ORTHOPEDICS 7K  DAILY NOTE    Time:  Time In: 5190  Time Out: 1413  Timed Code Treatment Minutes: 24 Minutes  Minutes: 24    Date: 2018  Patient Name: Veronica Blair,   Gender: male      Room: [facility]01/001-A  MRN: [id number]  : 1945  (74 y.o.)  Referring Practitioner: Roseanna Kimball MD  Diagnosis: Spinal Stenosis  Additional Pertinent Hx: This 74year old male is s/p LUMBAR LAMINECTOMY WITH PSF/PLIF L4-5, L3 sky/PSF WITH CAPTIVA, DBM and LOCAL BONE GRAFTING  on 18 by Dr. Melissa Hebert    Past Medical History:   Diagnosis Date    Cancer [facility])     prostate    Hypertension      Past Surgical History:   Procedure Laterality Date    COLONOSCOPY      CORONARY ANGIOPLASTY WITH STENT PLACEMENT      JOINT REPLACEMENT Left     knee    PARATHYROID GLAND SURGERY      PT'S WIFE STATES HE HAS 1.5 OF HIS GLANDS LEFT; DONE AT [facility]    [place] OFFICE/OUTPT VISIT,PROCEDURE ONLY N/A 2018    LUMBAR LAMINECTOMY WITH PSF/PLIF L4-5 WITH CAPTIVA, DBM LOCAL BONE GRAFTING and ICBG performed by Roseanna Kimball MD at [address] [place] [address]         Restrictions/Precautions:  General Precautions, Fall Risk     Spinal Precautions: No Bending, No Lifting, No Twisting  Spinal: Lumbar Corset    Prior Level of Function:  ADL Assistance: Independent  Homemaking Assistance: Needs assistance  Ambulation Assistance: Independent  Transfer Assistance: Independent       Subjective  Subjective: Rn okayed OT session. Upon arrival patient was sleeping in bed. Pt was agreeable to OT session. Overall Orientation Status: Within Functional Limits    Pain:  Pain Assessment  Patient Currently in Pain: Yes  Pain Assessment: 0-10  Pain Level: 4  Pain Type: Surgical pain  Pain Location: Back  Pain Orientation: Mid       Objective  Overall Cognitive Status: WFL    ADL  Grooming: Other (Comment) (Pt educated to complete grooming tasks while maintaining spinal precautions.  Pt educated to squat at  sink or spit in basin during oral care to prevent bending. )  UE Dressing: Stand by assistance (To don Lumbar Corset )  LE Dressing: Stand by assistance (Pt and Spouse educated on LB dressing technique to don/doff socks with no AD and while maintaining spinal precautions. Pt demonstrated don/doff B socks with SBA while sitting EOB. )     Bed mobility  Supine to Sit: Contact guard assistance  Sit to Supine: Contact guard assistance  Scooting: Stand by assistance (To scoot hips to EOB. )    Transfers  Sit to stand: Contact guard assistance (From EOB with min vc for safety. )  Stand to sit: Contact guard assistance (onto EOB. )       Balance  Sitting Balance: Supervision  Standing Balance: Contact guard assistance     Time: 45 seconds  Activity: Prep to ambulate      Functional Mobility  Functional - Mobility Device: Rolling Walker  Activity: Other  Assist Level: Stand by assistance  Functional Mobility Comments: Pt ambulated around unit, slow pace, No LOB,      Activity Tolerance:  Activity Tolerance: Patient limited by fatigue;Patient limited by pain    Assessment:  Assessment: this 74year old male is s/p spinal sx. Pt requires skilled OT intervention to increase indep and safety with all self cares, transfers, and mobility to decrease fall risk and return to PLOF. Performance deficits / Impairments: Decreased functional mobility , Decreased ADL status, Decreased strength, Decreased endurance, Decreased safe awareness, Decreased high-level IADLs  Prognosis: Good  Discharge Recommendations: Continue to assess pending progress, Home with assist PRN    Patient Education:  Patient Education: OT Role, OT POC, Transfer safety, walker safety, spinal precautions. Barriers to Learning: None     Equipment Recommendations:  Equipment Needed: No    Safety:  Safety Devices in place: Yes  Type of devices:  All fall risk precautions in place, Call light within reach, Gait belt, Patient at risk for falls,

## 2018-08-30 LAB
BASOPHILS # BLD: 0.3 %
BASOPHILS ABSOLUTE: 0 THOU/MM3 (ref 0–0.1)
EOSINOPHIL # BLD: 2.5 %
EOSINOPHILS ABSOLUTE: 0.2 THOU/MM3 (ref 0–0.4)
ERYTHROCYTE [DISTWIDTH] IN BLOOD BY AUTOMATED COUNT: 14.5 % (ref 11.5–14.5)
ERYTHROCYTE [DISTWIDTH] IN BLOOD BY AUTOMATED COUNT: 48.2 FL (ref 35–45)
HCT VFR BLD CALC: 33.2 % (ref 42–52)
HEMOGLOBIN: 10.9 GM/DL (ref 14–18)
IMMATURE GRANS (ABS): 0.05 THOU/MM3 (ref 0–0.07)
IMMATURE GRANULOCYTES: 0.6 %
LYMPHOCYTES # BLD: 14.6 %
LYMPHOCYTES ABSOLUTE: 1.2 THOU/MM3 (ref 1–4.8)
MCH RBC QN AUTO: 29.9 PG (ref 26–33)
MCHC RBC AUTO-ENTMCNC: 32.8 GM/DL (ref 32.2–35.5)
MCV RBC AUTO: 91.2 FL (ref 80–94)
MONOCYTES # BLD: 10.4 %
MONOCYTES ABSOLUTE: 0.8 THOU/MM3 (ref 0.4–1.3)
NUCLEATED RED BLOOD CELLS: 0 /100 WBC
PLATELET # BLD: 305 THOU/MM3 (ref 130–400)
PMV BLD AUTO: 8.8 FL (ref 9.4–12.4)
RBC # BLD: 3.64 MILL/MM3 (ref 4.7–6.1)
SEG NEUTROPHILS: 71.6 %
SEGMENTED NEUTROPHILS ABSOLUTE COUNT: 5.7 THOU/MM3 (ref 1.8–7.7)
WBC # BLD: 7.9 THOU/MM3 (ref 4.8–10.8)

## 2018-08-30 PROCEDURE — 85025 COMPLETE CBC W/AUTO DIFF WBC: CPT

## 2018-08-30 PROCEDURE — 2580000003 HC RX 258: Performed by: ORTHOPAEDIC SURGERY

## 2018-08-30 PROCEDURE — 36415 COLL VENOUS BLD VENIPUNCTURE: CPT

## 2018-08-30 PROCEDURE — 1200000000 HC SEMI PRIVATE

## 2018-08-30 PROCEDURE — 97110 THERAPEUTIC EXERCISES: CPT

## 2018-08-30 PROCEDURE — 97116 GAIT TRAINING THERAPY: CPT

## 2018-08-30 PROCEDURE — 97530 THERAPEUTIC ACTIVITIES: CPT

## 2018-08-30 PROCEDURE — 6370000000 HC RX 637 (ALT 250 FOR IP): Performed by: ORTHOPAEDIC SURGERY

## 2018-08-30 RX ORDER — CYCLOBENZAPRINE HCL 10 MG
10 TABLET ORAL 3 TIMES DAILY PRN
Qty: 50 TABLET | Refills: 0 | Status: SHIPPED | OUTPATIENT
Start: 2018-08-30 | End: 2018-09-09

## 2018-08-30 RX ORDER — OXYCODONE HCL 10 MG/1
10 TABLET, FILM COATED, EXTENDED RELEASE ORAL EVERY 12 HOURS SCHEDULED
Qty: 1 TABLET | Refills: 0 | Status: SHIPPED | OUTPATIENT
Start: 2018-08-30 | End: 2018-09-13

## 2018-08-30 RX ORDER — OXYCODONE HYDROCHLORIDE AND ACETAMINOPHEN 5; 325 MG/1; MG/1
1 TABLET ORAL EVERY 6 HOURS PRN
Qty: 28 TABLET | Refills: 0 | Status: SHIPPED | OUTPATIENT
Start: 2018-08-30 | End: 2018-09-06

## 2018-08-30 RX ADMIN — Medication 10 ML: at 08:17

## 2018-08-30 RX ADMIN — DOCUSATE SODIUM 100 MG: 100 CAPSULE, LIQUID FILLED ORAL at 21:31

## 2018-08-30 RX ADMIN — Medication 10 ML: at 21:30

## 2018-08-30 RX ADMIN — OLMESARTAN MEDOXOMIL AND HYDROCHLOROTHIAZIDE 40/25 0.5 TABLET: 40; 25 TABLET ORAL at 08:17

## 2018-08-30 RX ADMIN — METOPROLOL SUCCINATE 12.5 MG: 25 TABLET, EXTENDED RELEASE ORAL at 08:17

## 2018-08-30 RX ADMIN — OXYCODONE HYDROCHLORIDE AND ACETAMINOPHEN 1 TABLET: 5; 325 TABLET ORAL at 16:45

## 2018-08-30 ASSESSMENT — PAIN DESCRIPTION - LOCATION
LOCATION: BACK

## 2018-08-30 ASSESSMENT — PAIN DESCRIPTION - PROGRESSION

## 2018-08-30 ASSESSMENT — PAIN SCALES - GENERAL
PAINLEVEL_OUTOF10: 2
PAINLEVEL_OUTOF10: 0
PAINLEVEL_OUTOF10: 2
PAINLEVEL_OUTOF10: 3

## 2018-08-30 ASSESSMENT — PAIN DESCRIPTION - FREQUENCY
FREQUENCY: CONTINUOUS

## 2018-08-30 ASSESSMENT — PAIN DESCRIPTION - PAIN TYPE
TYPE: SURGICAL PAIN

## 2018-08-30 ASSESSMENT — PAIN DESCRIPTION - ORIENTATION
ORIENTATION: LOWER
ORIENTATION: MID;LOWER
ORIENTATION: LOWER

## 2018-08-30 ASSESSMENT — PAIN DESCRIPTION - DESCRIPTORS
DESCRIPTORS: SORE
DESCRIPTORS: ACHING

## 2018-08-30 ASSESSMENT — PAIN DESCRIPTION - ONSET
ONSET: ON-GOING

## 2018-08-30 NOTE — PROGRESS NOTES
EYAL  Home Equipment: Rolling walker, Cane     Objective:       Transfers  Sit to Stand: Supervision  Stand to sit: Supervision       Ambulation 1  Surface: level tile  Device: Rolling Walker  Assistance: Supervision;Stand by assistance  Quality of Gait: Decreased foreign and velocity. reciprocal gait pattern. Cues at times required for posture. Pt. steady with no LOB. Distance: ~500+ feet    Stairs  Stairs Height: 6\"  Rails: Bilateral  Device: No Device  Assistance: Stand by assistance  Comment: Reciprocal pattern. Pt. steady with no LOB. Exercises:  Exercises  Comments: Pt performed seated B LE ther ex 10x each consisting of ankle pumps, glute/quad sets, hip abd/add, heel slides, marches and LAQ all to increase strength for improved gait. Activity Tolerance:  Activity Tolerance: Patient Tolerated treatment well    Assessment: Body structures, Functions, Activity limitations: Decreased functional mobility , Decreased strength  Assessment: Pt. tolerated session well. Pt. feeling well today. Pt. moves well and is steady on feet. Prognosis: Excellent     REQUIRES PT FOLLOW UP: Yes  Discharge Recommendations: Home with assist PRN    Patient Education:  Patient Education: POC, ther ex, posture, stairs    Equipment Recommendations:  Equipment Needed: No    Safety:  Type of devices: All fall risk precautions in place, Call light within reach, Chair alarm in place, Gait belt, Left in chair, Nurse notified  Restraints  Initially in place: No    Plan:  Times per week: 6 X O  Times per day: Daily  Current Treatment Recommendations: Strengthening, Functional Mobility Training, Transfer Training, Stair training, Gait Training, Home Exercise Program, Patient/Caregiver Education & Training, Safety Education & Training, Equipment Evaluation, Education, & procurement    Goals:  Patient goals : To go home.     Short term goals  Time Frame for Short term goals: 1 week  Short term goal 1: Pt to transfer supine <-->

## 2018-08-30 NOTE — PROGRESS NOTES
increase indep and safety in home environment. Long term goals  Time Frame for Long term goals : No LTGs d/t short estimated length of stay.

## 2018-08-30 NOTE — PLAN OF CARE
Problem: Pain:  Goal: Pain level will decrease  Pain level will decrease   Outcome: Met This Shift  Rating pain from 0-4 with pain goal of 3. Oral pain meds available, but declined most of day. Took one pain pill this evening and satisfied with pain control    Problem: Neurological  Goal: Maximum potential motor/sensory/cognitive function  Outcome: Met This Shift  Ambulates in hallway independent with steady gait and tolerates activity very well. Problem: Cardiovascular  Goal: No DVT, peripheral vascular complications  Outcome: Met This Shift  Denies all s/s dvt    Problem: Respiratory  Goal: No pulmonary complications  Outcome: Met This Shift  Lungs clear. No chest pain nor shortness of breath. Maintaining greater than 90%  On room air    Problem: GI  Goal: No bowel complications  Outcome: Ongoing  Abdomen soft with abs. Passing flatus and has had small bm this visit    Problem:   Goal: Adequate urinary output  Outcome: Met This Shift  Voiding quantities sufficient per bathroom visits    Problem: Nutrition  Goal: Optimal nutrition therapy  Outcome: Met This Shift  Eating quantities sufficient    Problem: Skin Integrity/Risk  Goal: No skin breakdown during hospitalization  Outcome: Ongoing  Stacie surgical incision. drsg clean, dry and intact with hemovacs in place. No new skin issues noted at this time    Problem: Safety:  Goal: Free from accidental physical injury  Free from accidental physical injury   Outcome: Met This Shift  Safety measures in place. Uses call light appropriately. Problem: Daily Care:  Goal: Daily care needs are met  Daily care needs are met   Outcome: Met This Shift  Able to participate in adls with minimum assist or set up by family or staff    Problem: Discharge Planning:  Goal: Patients continuum of care needs are met  Patients continuum of care needs are met   Outcome: Ongoing  Planning home with spouse at discharge.       Comments: Care plan reviewed with patient and spouse who verbalize understanding of the plan of care and contribute to goal setting.

## 2018-08-31 VITALS
TEMPERATURE: 98.1 F | SYSTOLIC BLOOD PRESSURE: 124 MMHG | BODY MASS INDEX: 30.35 KG/M2 | DIASTOLIC BLOOD PRESSURE: 75 MMHG | RESPIRATION RATE: 16 BRPM | OXYGEN SATURATION: 97 % | WEIGHT: 216.8 LBS | HEIGHT: 71 IN | HEART RATE: 88 BPM

## 2018-08-31 PROCEDURE — 97110 THERAPEUTIC EXERCISES: CPT

## 2018-08-31 PROCEDURE — 97116 GAIT TRAINING THERAPY: CPT

## 2018-08-31 PROCEDURE — 6370000000 HC RX 637 (ALT 250 FOR IP): Performed by: ORTHOPAEDIC SURGERY

## 2018-08-31 RX ADMIN — OXYCODONE HYDROCHLORIDE AND ACETAMINOPHEN 1 TABLET: 5; 325 TABLET ORAL at 07:40

## 2018-08-31 ASSESSMENT — PAIN SCALES - GENERAL
PAINLEVEL_OUTOF10: 2
PAINLEVEL_OUTOF10: 2
PAINLEVEL_OUTOF10: 4
PAINLEVEL_OUTOF10: 3

## 2018-08-31 ASSESSMENT — PAIN DESCRIPTION - DESCRIPTORS: DESCRIPTORS: ACHING

## 2018-08-31 ASSESSMENT — PAIN DESCRIPTION - FREQUENCY: FREQUENCY: CONTINUOUS

## 2018-08-31 ASSESSMENT — PAIN DESCRIPTION - PAIN TYPE
TYPE: SURGICAL PAIN
TYPE: SURGICAL PAIN

## 2018-08-31 ASSESSMENT — PAIN DESCRIPTION - LOCATION
LOCATION: BACK
LOCATION: BACK

## 2018-08-31 ASSESSMENT — PAIN DESCRIPTION - ORIENTATION: ORIENTATION: LOWER

## 2018-08-31 ASSESSMENT — PAIN DESCRIPTION - ONSET: ONSET: ON-GOING

## 2018-08-31 NOTE — PROGRESS NOTES
goals: 1 week  Short term goal 1: Pt to transfer supine <--> sit S to enable pt to get in/out of bed. Short term goal 2: Pt to transfer sit <--> stand mod I for increased functional mobility. Short term goal 3: Pt to ambulate >500 feet with RW mod I for household ambulation. Short term goal 4: Pt to ascend/descend 2 steps without HR SBA for home entry. Short term goal 5: Pt to perform car transfer SBA to enable pt to get in/out of the car. Long term goals  Time Frame for Long term goals : NA due to short length of stay.

## 2018-08-31 NOTE — PROGRESS NOTES
Department of Orthopedic Surgery  Spine Service  Zoila Braun PA-C Progress Note        Subjective:  Drains out. Doing well Ready for d/c    Vitals  VITALS:  /68   Pulse 77   Temp 98.7 °F (37.1 °C) (Oral)   Resp 16   Ht 5' 10.5\" (1.791 m)   Wt 216 lb 12.8 oz (98.3 kg)   SpO2 94%   BMI 30.67 kg/m²   24HR INTAKE/OUTPUT:    Intake/Output Summary (Last 24 hours) at 08/31/18 4911  Last data filed at 08/31/18 0522   Gross per 24 hour   Intake              920 ml   Output              110 ml   Net              810 ml     URINARY CATHETER OUTPUT (Ramos):  [REMOVED] Urethral Catheter-Output (mL): 550 mL  DRAIN/TUBE OUTPUT:  Closed/Suction Drain Posterior; Left Back Accordion-Output (ml): 5 ml  Closed/Suction Drain Right;Posterior Back Accordion-Output (ml): 25 ml      PHYSICAL EXAM:    Orientation:  alert and oriented to person, place and time    Incision:  dressing in place, clean, dry, intact    Lower Extremity Motor :  quadriceps, extensor hallucis longus, dorsiflexion, plantarflexion 5/5 bilaterally  Lower Extremity Sensory:  Intact L1-S1    Flatus:  positive    ABNORMAL EXAM FINDINGS:  none    LABS:    HgB:    Lab Results   Component Value Date    HGB 10.9 08/30/2018         ASSESSMENT AND PLAN:    Post operative day 4     1:  Remove drains, change dressing.   2:  Activity Level:  Up ad chasity  3:  Pain Control:  Controlled  4:  Discharge Planning:  Home this am

## 2018-08-31 NOTE — PLAN OF CARE
Problem: Pain:  Goal: Pain level will decrease  Pain level will decrease   Outcome: Ongoing  Patient states pain to back 2/10. Patient declines pain medication. White board updated. Pain goal 3/10. Back brace on when up   Goal: Control of acute pain  Control of acute pain   Outcome: Ongoing  Patient states pain to back 2/10. Patient declines pain medication. White board updated. Pain goal 3/10. Back brace on when up   Goal: Control of chronic pain  Control of chronic pain   Outcome: Ongoing  Patient states pain to back 2/10. Patient declines pain medication. White board updated. Pain goal 3/10. Back brace on when up     Problem: Pain Control  Goal: Maintain pain level at or below patient's acceptable level (or 5 if patient is unable to determine acceptable level)  Outcome: Ongoing  Patient states pain to back 2/10. Patient declines pain medication. White board updated. Pain goal 3/10. Back brace on when up   Goal: Improvement in pain related behaviors BP/HR WNL  Outcome: Ongoing  Patient states pain to back 2/10. Patient declines pain medication. White board updated. Pain goal 3/10. Back brace on when up. Vitals stable. Problem: Neurological  Goal: Maximum potential motor/sensory/cognitive function  Outcome: Ongoing  Denies numbness or tingling. Oriented x4    Problem: Cardiovascular  Goal: No DVT, peripheral vascular complications  Outcome: Ongoing  Denies calf pain and tenderness. Peripheral vascular within normal limits. scds off while in chair     Problem: Respiratory  Goal: No pulmonary complications  Outcome: Ongoing  Lung sounds clear throughout. Oxygen saturation above 90% on room air. Encouraged to cough and deep breathe and use incentive spirometer   Goal: O2 Sat > 90%  Outcome: Ongoing  Lung sounds clear throughout. Oxygen saturation above 90% on room air.  Encouraged to cough and deep breathe and use incentive spirometer     Problem: GI  Goal: No bowel complications  Outcome: Ongoing  No bowel sounds active. Abdomen soft and non-tender. Passing gas. Bowel movement since surgery     Problem:   Goal: Adequate urinary output  Outcome: Ongoing  Adequate urine output. Urine is yellow and clear   Goal: No urinary complication  Outcome: Ongoing  Adequate urine output. Urine is yellow and clear     Problem: Nutrition  Goal: Optimal nutrition therapy  Outcome: Ongoing  Patient on general diet. Denies nausea     Problem: Skin Integrity/Risk  Goal: No skin breakdown during hospitalization  Outcome: Ongoing  No skin breakdown noted. Surgical incision to back, dressing clean, dry, and intact. No drainage noted. 2 hemovac drains in place   Goal: Wound healing  Outcome: Ongoing  No skin breakdown noted. Surgical incision to back, dressing clean, dry, and intact. No drainage noted. 2 hemovac drains in place     Problem: Musculor/Skeletal Functional Status  Goal: Highest potential functional level  Outcome: Ongoing  Patient has strong hand  and pedal push and pull bilaterally. Patient up ad chasity. Stead on feet. Back brace on when up     Problem: Safety:  Goal: Free from accidental physical injury  Free from accidental physical injury   Outcome: Ongoing  Patient up ad chasity. Steady on feet. Non-skid socks on. Back brace on     Problem: Daily Care:  Goal: Daily care needs are met  Daily care needs are met   Outcome: Ongoing  Patient able to perform adls independently. Table and call light within reach     Problem: Discharge Planning:  Goal: Patients continuum of care needs are met  Patients continuum of care needs are met   Outcome: Ongoing  Discharge planning to home    Problem: DISCHARGE BARRIERS  Goal: Patient's continuum of care needs are met  Outcome: Ongoing  Discharge planning to home    Comments: Care plan reviewed with patient and daughter. Patient and daughter verbalize understanding of the plan of care and contribute to goal setting.

## 2018-09-02 NOTE — DISCHARGE SUMMARY
[address] [place] [address]                                 DISCHARGE SUMMARY    PATIENT NAME: Pedro Argueta                        :        1945  MED REC NO:   [id number]                           ROOM:       [id number]  ACCOUNT NO:   [de-identified]                           ADMIT DATE: 2018  PROVIDER:     Nick Harrington PA-C                    [facility] DATE: 2018    DISCHARGE DIAGNOSES:  1. Lumbar spinal stenosis. 2.  Neurogenic claudication and leg radiculopathy. 3.  Degenerative disk disease. 4.  Retrolisthesis at the L4-L5 level. OPERATION PERFORMED:  An L3-L5 decompressive laminectomy and fusion with  insertion of interbody cage fusion at the levels of L4-L5. HOSPITAL COURSE:  The patient, 74years of age, presented to our office  having symptoms of lumbar pain and leg radiculopathy bilaterally giving him  difficulty with standing, walking, and working. His symptoms failed to  improve despite conservative management, and for that, he was in preference  of pursuing operative management. He underwent surgery on 2018 and  was thereafter admitted to floor 7 for continued management. On his first  day postoperatively, he was seen and did note excellent resolution of his  leg radiculopathy. He noted that he was able to lie on his back for the  first time in quite some time. He showed excellent strength 5/5 with  resisted knee extension, dorsiflexion, plantar flexion, and great toe  extension. He was sensate throughout the lower extremity. He was able to  be up and walking with physical therapy and was able to do an exceptional  bit of walking with four to five laps around the unit on his very first day  after operation. This trend continued over the next several days and he  was doing quite well.   He was given suppository and enema on his second day  postoperatively and did have some success with release of gas

## (undated) DEVICE — Device

## (undated) DEVICE — PROBE STIM 3 MM FOR PEDCL SCREW DISP

## (undated) DEVICE — SUTURE ETHLN SZ 3-0 L18IN NONABSORBABLE BLK FS-1 L24MM 3/8 663H

## (undated) DEVICE — SUTURE VCRL SZ 1 L18IN ABSRB VLT CTX L48MM 1/2 CIR J765D

## (undated) DEVICE — SUTURE MCRYL SZ 3-0 L27IN ABSRB UD L24MM PS-1 3/8 CIR PRIM Y936H

## (undated) DEVICE — DRAPE,INSTRUMENT,MAGNETIC,10X16: Brand: MEDLINE

## (undated) DEVICE — BLADE CLIPPER GEN PURP NS

## (undated) DEVICE — THE MILL DISPOSABLE - MEDIUM

## (undated) DEVICE — HEAD POSITIONER, SURGICAL: Brand: DEROYAL

## (undated) DEVICE — CRADLE POS 3X5X24IN RASPBERRY ARM PRONE FOAM DISP

## (undated) DEVICE — GLOVE ORANGE PI 8 1/2   MSG9085

## (undated) DEVICE — SOLUTION IV 1000ML 0.9% SOD CHL PH 5 INJ USP VIAFLX PLAS

## (undated) DEVICE — GLOVE ORANGE PI 8   MSG9080

## (undated) DEVICE — BUR RND FLUT AGRSV 5MM

## (undated) DEVICE — SUTURE STRATAFIX SYMMETRIC SZ 1 L18IN ABSRB VLT CT1 L36CM SXPP1A404

## (undated) DEVICE — INTENDED FOR TISSUE SEPARATION, AND OTHER PROCEDURES THAT REQUIRE A SHARP SURGICAL BLADE TO PUNCTURE OR CUT.: Brand: BARD-PARKER ® CARBON RIB-BACK BLADES

## (undated) DEVICE — KIT EVAC 400CC PVC RADPQ Y CONN

## (undated) DEVICE — SET AUTOTRNS C175ML BOWL BTM OUTLT RESERVOIRXTRA

## (undated) DEVICE — SUTURE ABSRB BRAID COAT UD CP NO 2 27IN VCRL J195H

## (undated) DEVICE — TOTAL TRAY, DB, 100% SILI FOLEY, 16FR 10: Brand: MEDLINE

## (undated) DEVICE — STRIP,CLOSURE,WOUND,MEDI-STRIP,1/2X4: Brand: MEDLINE

## (undated) DEVICE — 3M™ STERI-STRIP™ COMPOUND BENZOIN TINCTURE 40 BAGS/CARTON 4 CARTONS/CASE C1544: Brand: 3M™ STERI-STRIP™

## (undated) DEVICE — 1010 S-DRAPE TOWEL DRAPE 10/BX: Brand: STERI-DRAPE™

## (undated) DEVICE — SPONGE LAP W18XL18IN WHT COT 4 PLY FLD STRUNG RADPQ DISP ST

## (undated) DEVICE — KAIRISON TUBING SET PNEUMATIC, (3000 MM), STERILE, DISPOSABLE, TO BE USED WITH: FK898R, PACKAGE OF 10 PIECES: Brand: KAIRISON

## (undated) DEVICE — SPONGE GZ W4XL4IN COT 12 PLY TYP VII WVN C FLD DSGN

## (undated) DEVICE — GOWN,SIRUS,NONRNF,SETINSLV,XL,20/CS: Brand: MEDLINE

## (undated) DEVICE — ULTRACLEAN ACCESSORY ELECTRODE 6" (15.24 CM) COATED BLADE: Brand: ULTRACLEAN

## (undated) DEVICE — GLOVE ORANGE PI 7   MSG9070

## (undated) DEVICE — CODMAN® SURGICAL PATTIES 1" X 1" (2.54CM X 2.54CM): Brand: CODMAN®

## (undated) DEVICE — SUTURE VCRL SZ 2-0 L27IN ABSRB UD L36MM CP-1 1/2 CIR REV J266H

## (undated) DEVICE — BLOOD TRANSFUSION FILTER: Brand: HAEMONETICS

## (undated) DEVICE — CONMED DISPOSABLE BIPOLAR CABLE, 10' (3.05M): Brand: CONMED

## (undated) DEVICE — TAPE,CLOTH/SILK,CURAD,3"X10YD,LF,40/CS: Brand: CURAD

## (undated) DEVICE — 3M™ BAIR HUGGER® MULTI ACCESS BLANKET, PEDIATRIC, FULL BODY, 10 PER CASE 31000: Brand: BAIR HUGGER™

## (undated) DEVICE — GLOVE SURG SZ 65 THK91MIL LTX FREE SYN POLYISOPRENE

## (undated) DEVICE — LINE ASPIR 1/4 ANTICOAG

## (undated) DEVICE — SOLUTION IV IRRIG WATER 1000ML POUR BRL 2F7114

## (undated) DEVICE — GOWN,SIRUS,NON REINFRCD,LARGE,SET IN SL: Brand: MEDLINE

## (undated) DEVICE — 3M™ WARMING BLANKET, UPPER BODY, 10 PER CASE, 42268: Brand: BAIR HUGGER™

## (undated) DEVICE — DRAIN SURG 10FR PVC TB W/ TRCR MID PERF NO RESVR HUBLESS

## (undated) DEVICE — 450 ML BOTTLE OF 0.05% CHLORHEXIDINE GLUCONATE IN 99.95% STERILE WATER FOR IRRIGATION, USP AND APPLICATOR.: Brand: IRRISEPT ANTIMICROBIAL WOUND LAVAGE

## (undated) DEVICE — BIPOLAR SEALER 23-112-1 AQM 6.0: Brand: AQUAMANTYS ®